# Patient Record
Sex: FEMALE | Race: BLACK OR AFRICAN AMERICAN | Employment: OTHER | URBAN - METROPOLITAN AREA
[De-identification: names, ages, dates, MRNs, and addresses within clinical notes are randomized per-mention and may not be internally consistent; named-entity substitution may affect disease eponyms.]

---

## 2021-03-29 ENCOUNTER — APPOINTMENT (OUTPATIENT)
Dept: GENERAL RADIOLOGY | Age: 66
DRG: 871 | End: 2021-03-29
Attending: EMERGENCY MEDICINE
Payer: MEDICARE

## 2021-03-29 ENCOUNTER — HOSPITAL ENCOUNTER (INPATIENT)
Age: 66
LOS: 3 days | Discharge: HOME | DRG: 871 | End: 2021-04-01
Attending: EMERGENCY MEDICINE | Admitting: INTERNAL MEDICINE
Payer: MEDICARE

## 2021-03-29 DIAGNOSIS — U07.1 PNEUMONIA DUE TO COVID-19 VIRUS: Primary | ICD-10-CM

## 2021-03-29 DIAGNOSIS — J12.82 PNEUMONIA DUE TO COVID-19 VIRUS: Primary | ICD-10-CM

## 2021-03-29 PROBLEM — Z20.822 SUSPECTED COVID-19 VIRUS INFECTION: Status: ACTIVE | Noted: 2021-03-29

## 2021-03-29 PROBLEM — J18.9 ACUTE PNEUMONIA: Status: ACTIVE | Noted: 2021-03-29

## 2021-03-29 LAB
ALBUMIN SERPL-MCNC: 3.4 G/DL (ref 3.4–5)
ALBUMIN/GLOB SERPL: 0.7 {RATIO} (ref 0.8–1.7)
ALP SERPL-CCNC: 200 U/L (ref 45–117)
ALT SERPL-CCNC: 53 U/L (ref 13–56)
ANION GAP SERPL CALC-SCNC: 8 MMOL/L (ref 3–18)
APPEARANCE UR: ABNORMAL
AST SERPL-CCNC: 60 U/L (ref 10–38)
ATRIAL RATE: 102 BPM
BACTERIA URNS QL MICRO: ABNORMAL /HPF
BASOPHILS # BLD: 0 K/UL (ref 0–0.1)
BASOPHILS NFR BLD: 0 % (ref 0–2)
BILIRUB SERPL-MCNC: 0.3 MG/DL (ref 0.2–1)
BILIRUB UR QL: NEGATIVE
BUN SERPL-MCNC: 10 MG/DL (ref 7–18)
BUN/CREAT SERPL: 12 (ref 12–20)
CALCIUM SERPL-MCNC: 8.5 MG/DL (ref 8.5–10.1)
CALCULATED P AXIS, ECG09: 65 DEGREES
CALCULATED R AXIS, ECG10: 60 DEGREES
CALCULATED T AXIS, ECG11: 34 DEGREES
CHLORIDE SERPL-SCNC: 102 MMOL/L (ref 100–111)
CK MB CFR SERPL CALC: NORMAL % (ref 0–4)
CK MB SERPL-MCNC: <1 NG/ML (ref 5–25)
CK SERPL-CCNC: 110 U/L (ref 26–192)
CO2 SERPL-SCNC: 28 MMOL/L (ref 21–32)
COLOR UR: YELLOW
COVID-19 RAPID TEST, COVR: DETECTED
CREAT SERPL-MCNC: 0.82 MG/DL (ref 0.6–1.3)
CRP SERPL-MCNC: 3.9 MG/DL (ref 0–0.3)
D DIMER PPP FEU-MCNC: 0.6 UG/ML(FEU)
DIAGNOSIS, 93000: NORMAL
DIFFERENTIAL METHOD BLD: NORMAL
EOSINOPHIL # BLD: 0 K/UL (ref 0–0.4)
EOSINOPHIL NFR BLD: 0 % (ref 0–5)
EPITH CASTS URNS QL MICRO: ABNORMAL /LPF (ref 0–5)
ERYTHROCYTE [DISTWIDTH] IN BLOOD BY AUTOMATED COUNT: 13.7 % (ref 11.6–14.5)
FERRITIN SERPL-MCNC: 292 NG/ML (ref 8–388)
FIBRINOGEN PPP-MCNC: 528 MG/DL (ref 210–451)
GLOBULIN SER CALC-MCNC: 4.6 G/DL (ref 2–4)
GLUCOSE SERPL-MCNC: 88 MG/DL (ref 74–99)
GLUCOSE UR STRIP.AUTO-MCNC: NEGATIVE MG/DL
GRAN CASTS URNS QL MICRO: ABNORMAL /LPF
HCT VFR BLD AUTO: 37.9 % (ref 35–45)
HGB BLD-MCNC: 12.3 G/DL (ref 12–16)
HGB UR QL STRIP: ABNORMAL
INR PPP: 1.1 (ref 0.8–1.2)
KETONES UR QL STRIP.AUTO: ABNORMAL MG/DL
LACTATE BLD-SCNC: 0.82 MMOL/L (ref 0.4–2)
LDH SERPL L TO P-CCNC: 363 U/L (ref 81–234)
LEUKOCYTE ESTERASE UR QL STRIP.AUTO: NEGATIVE
LYMPHOCYTES # BLD: 2.4 K/UL (ref 0.9–3.6)
LYMPHOCYTES NFR BLD: 32 % (ref 21–52)
MAGNESIUM SERPL-MCNC: 2.1 MG/DL (ref 1.6–2.6)
MCH RBC QN AUTO: 28.3 PG (ref 24–34)
MCHC RBC AUTO-ENTMCNC: 32.5 G/DL (ref 31–37)
MCV RBC AUTO: 87.1 FL (ref 74–97)
MONOCYTES # BLD: 0.7 K/UL (ref 0.05–1.2)
MONOCYTES NFR BLD: 10 % (ref 3–10)
NEUTS SEG # BLD: 4.4 K/UL (ref 1.8–8)
NEUTS SEG NFR BLD: 58 % (ref 40–73)
NITRITE UR QL STRIP.AUTO: NEGATIVE
P-R INTERVAL, ECG05: 146 MS
PH UR STRIP: 6 [PH] (ref 5–8)
PLATELET # BLD AUTO: 281 K/UL (ref 135–420)
PMV BLD AUTO: 10.3 FL (ref 9.2–11.8)
POTASSIUM SERPL-SCNC: 2.8 MMOL/L (ref 3.5–5.5)
PROCALCITONIN SERPL-MCNC: <0.05 NG/ML
PROT SERPL-MCNC: 8 G/DL (ref 6.4–8.2)
PROT UR STRIP-MCNC: 100 MG/DL
PROTHROMBIN TIME: 13.7 SEC (ref 11.5–15.2)
Q-T INTERVAL, ECG07: 348 MS
QRS DURATION, ECG06: 78 MS
QTC CALCULATION (BEZET), ECG08: 453 MS
RBC # BLD AUTO: 4.35 M/UL (ref 4.2–5.3)
RBC #/AREA URNS HPF: NEGATIVE /HPF (ref 0–5)
SODIUM SERPL-SCNC: 138 MMOL/L (ref 136–145)
SOURCE, COVRS: ABNORMAL
SP GR UR REFRACTOMETRY: 1.02 (ref 1–1.03)
TROPONIN I SERPL-MCNC: <0.02 NG/ML (ref 0–0.04)
UROBILINOGEN UR QL STRIP.AUTO: 1 EU/DL (ref 0.2–1)
VENTRICULAR RATE, ECG03: 102 BPM
WBC # BLD AUTO: 7.5 K/UL (ref 4.6–13.2)
WBC URNS QL MICRO: ABNORMAL /HPF (ref 0–4)

## 2021-03-29 PROCEDURE — 96374 THER/PROPH/DIAG INJ IV PUSH: CPT

## 2021-03-29 PROCEDURE — 87635 SARS-COV-2 COVID-19 AMP PRB: CPT

## 2021-03-29 PROCEDURE — 82728 ASSAY OF FERRITIN: CPT

## 2021-03-29 PROCEDURE — 2709999900 HC NON-CHARGEABLE SUPPLY

## 2021-03-29 PROCEDURE — 85379 FIBRIN DEGRADATION QUANT: CPT

## 2021-03-29 PROCEDURE — 74011000258 HC RX REV CODE- 258: Performed by: EMERGENCY MEDICINE

## 2021-03-29 PROCEDURE — 93005 ELECTROCARDIOGRAM TRACING: CPT

## 2021-03-29 PROCEDURE — 87040 BLOOD CULTURE FOR BACTERIA: CPT

## 2021-03-29 PROCEDURE — 99285 EMERGENCY DEPT VISIT HI MDM: CPT

## 2021-03-29 PROCEDURE — 99223 1ST HOSP IP/OBS HIGH 75: CPT | Performed by: INTERNAL MEDICINE

## 2021-03-29 PROCEDURE — 77030038269 HC DRN EXT URIN PURWCK BARD -A

## 2021-03-29 PROCEDURE — 87449 NOS EACH ORGANISM AG IA: CPT

## 2021-03-29 PROCEDURE — 86140 C-REACTIVE PROTEIN: CPT

## 2021-03-29 PROCEDURE — 74011250636 HC RX REV CODE- 250/636: Performed by: EMERGENCY MEDICINE

## 2021-03-29 PROCEDURE — 80053 COMPREHEN METABOLIC PANEL: CPT

## 2021-03-29 PROCEDURE — 71045 X-RAY EXAM CHEST 1 VIEW: CPT

## 2021-03-29 PROCEDURE — 96375 TX/PRO/DX INJ NEW DRUG ADDON: CPT

## 2021-03-29 PROCEDURE — XW033E5 INTRODUCTION OF REMDESIVIR ANTI-INFECTIVE INTO PERIPHERAL VEIN, PERCUTANEOUS APPROACH, NEW TECHNOLOGY GROUP 5: ICD-10-PCS | Performed by: INTERNAL MEDICINE

## 2021-03-29 PROCEDURE — 74011250637 HC RX REV CODE- 250/637: Performed by: EMERGENCY MEDICINE

## 2021-03-29 PROCEDURE — 65660000000 HC RM CCU STEPDOWN

## 2021-03-29 PROCEDURE — 85610 PROTHROMBIN TIME: CPT

## 2021-03-29 PROCEDURE — 81001 URINALYSIS AUTO W/SCOPE: CPT

## 2021-03-29 PROCEDURE — 83615 LACTATE (LD) (LDH) ENZYME: CPT

## 2021-03-29 PROCEDURE — 83735 ASSAY OF MAGNESIUM: CPT

## 2021-03-29 PROCEDURE — 82553 CREATINE MB FRACTION: CPT

## 2021-03-29 PROCEDURE — 85025 COMPLETE CBC W/AUTO DIFF WBC: CPT

## 2021-03-29 PROCEDURE — 83605 ASSAY OF LACTIC ACID: CPT

## 2021-03-29 PROCEDURE — 84145 PROCALCITONIN (PCT): CPT

## 2021-03-29 PROCEDURE — 85384 FIBRINOGEN ACTIVITY: CPT

## 2021-03-29 RX ORDER — POTASSIUM CHLORIDE 20 MEQ/1
40 TABLET, EXTENDED RELEASE ORAL
Status: COMPLETED | OUTPATIENT
Start: 2021-03-29 | End: 2021-03-29

## 2021-03-29 RX ORDER — MORPHINE SULFATE 4 MG/ML
4 INJECTION INTRAVENOUS
Status: COMPLETED | OUTPATIENT
Start: 2021-03-29 | End: 2021-03-29

## 2021-03-29 RX ORDER — LEVOFLOXACIN 5 MG/ML
750 INJECTION, SOLUTION INTRAVENOUS EVERY 24 HOURS
Status: DISCONTINUED | OUTPATIENT
Start: 2021-03-29 | End: 2021-03-30

## 2021-03-29 RX ORDER — DEXAMETHASONE SODIUM PHOSPHATE 4 MG/ML
6 INJECTION, SOLUTION INTRA-ARTICULAR; INTRALESIONAL; INTRAMUSCULAR; INTRAVENOUS; SOFT TISSUE EVERY 24 HOURS
Status: DISCONTINUED | OUTPATIENT
Start: 2021-03-29 | End: 2021-04-01 | Stop reason: HOSPADM

## 2021-03-29 RX ORDER — VANCOMYCIN 2 GRAM/500 ML IN 0.9 % SODIUM CHLORIDE INTRAVENOUS
2000 ONCE
Status: COMPLETED | OUTPATIENT
Start: 2021-03-29 | End: 2021-03-30

## 2021-03-29 RX ORDER — SODIUM CHLORIDE 0.9 % (FLUSH) 0.9 %
5-10 SYRINGE (ML) INJECTION AS NEEDED
Status: DISCONTINUED | OUTPATIENT
Start: 2021-03-29 | End: 2021-04-01 | Stop reason: HOSPADM

## 2021-03-29 RX ORDER — ONDANSETRON 2 MG/ML
4 INJECTION INTRAMUSCULAR; INTRAVENOUS
Status: COMPLETED | OUTPATIENT
Start: 2021-03-29 | End: 2021-03-29

## 2021-03-29 RX ORDER — ACETAMINOPHEN 325 MG/1
650 TABLET ORAL
Status: DISCONTINUED | OUTPATIENT
Start: 2021-03-29 | End: 2021-04-01 | Stop reason: HOSPADM

## 2021-03-29 RX ORDER — ENOXAPARIN SODIUM 100 MG/ML
30 INJECTION SUBCUTANEOUS EVERY 12 HOURS
Status: DISCONTINUED | OUTPATIENT
Start: 2021-03-29 | End: 2021-03-30

## 2021-03-29 RX ORDER — ACETAMINOPHEN 650 MG/1
650 SUPPOSITORY RECTAL
Status: DISCONTINUED | OUTPATIENT
Start: 2021-03-29 | End: 2021-03-30

## 2021-03-29 RX ADMIN — VANCOMYCIN HYDROCHLORIDE 2000 MG: 10 INJECTION, POWDER, LYOPHILIZED, FOR SOLUTION INTRAVENOUS at 22:12

## 2021-03-29 RX ADMIN — ENOXAPARIN SODIUM 30 MG: 100 INJECTION SUBCUTANEOUS at 22:11

## 2021-03-29 RX ADMIN — SODIUM CHLORIDE 1000 ML: 900 INJECTION, SOLUTION INTRAVENOUS at 18:32

## 2021-03-29 RX ADMIN — LEVOFLOXACIN 750 MG: 750 INJECTION, SOLUTION INTRAVENOUS at 20:03

## 2021-03-29 RX ADMIN — SODIUM CHLORIDE 340 ML: 900 INJECTION, SOLUTION INTRAVENOUS at 18:00

## 2021-03-29 RX ADMIN — DEXAMETHASONE SODIUM PHOSPHATE 6 MG: 4 INJECTION, SOLUTION INTRAMUSCULAR; INTRAVENOUS at 22:11

## 2021-03-29 RX ADMIN — ONDANSETRON 4 MG: 2 INJECTION INTRAMUSCULAR; INTRAVENOUS at 19:55

## 2021-03-29 RX ADMIN — PIPERACILLIN AND TAZOBACTAM 3.38 G: 3; .375 INJECTION, POWDER, LYOPHILIZED, FOR SOLUTION INTRAVENOUS at 19:07

## 2021-03-29 RX ADMIN — MORPHINE SULFATE 4 MG: 4 INJECTION, SOLUTION INTRAMUSCULAR; INTRAVENOUS at 19:55

## 2021-03-29 RX ADMIN — POTASSIUM CHLORIDE 40 MEQ: 1500 TABLET, EXTENDED RELEASE ORAL at 22:11

## 2021-03-29 NOTE — Clinical Note
Status[de-identified] INPATIENT [101]   Type of Bed: Telemetry [19]   Inpatient Hospitalization Certified Necessary for the Following Reasons: 3.  Patient receiving treatment that can only be provided in an inpatient setting (further clarification in H&P documentation)   Admitting Diagnosis: Suspected COVID-19 virus infection [[id number]]   Admitting Diagnosis: Acute pneumonia [[id number]]   Admitting Physician: Jose Welch   Attending Physician: Jose Welch   Estimated Length of Stay: 2 Midnights   Discharge Plan[de-identified] Home with Office Follow-up

## 2021-03-29 NOTE — ED PROVIDER NOTES
EMERGENCY DEPARTMENT HISTORY AND PHYSICAL EXAM  This was created with voice recognition software and transcription errors may be present. 6:11 PM  Date: 3/29/2021  Patient Name: Kelton Mendez    History of Presenting Illness     Chief Complaint:    History Provided By:     HPI: Kelton Mendez is a 76 y.o. female no significant past medical history who is visiting from [facility] to stay around here with her brother. Who presents with 1 week of fever cough chills. No abdominal pain is associate with nausea but no vomiting. No chest pain. She is short of breath. No other aggravating or alleviating factors no other associated symptoms. Patient has no history of COPD or asthma asthma as a child    PCP: Alicia, Not On File      Past History     Past Medical History:  No past medical history on file. Past Surgical History:  No past surgical history on file. Family History:  No family history on file. Social History:  Social History     Tobacco Use    Smoking status: Not on file   Substance Use Topics    Alcohol use: Not on file    Drug use: Not on file       Allergies: Allergies   Allergen Reactions    Sulfur Hives       Review of Systems     Review of Systems   Constitutional: Negative for fever. All other systems reviewed and are negative. 10 point review of systems otherwise negative unless noted in HPI. Physical Exam       Physical Exam  Constitutional:       Appearance: She is well-developed. HENT:      Head: Normocephalic and atraumatic. Eyes:      Pupils: Pupils are equal, round, and reactive to light. Neck:      Musculoskeletal: Normal range of motion and neck supple. Cardiovascular:      Rate and Rhythm: Normal rate and regular rhythm. Heart sounds: Normal heart sounds. No murmur. No friction rub. Pulmonary:      Effort: Pulmonary effort is normal. No respiratory distress. Breath sounds: Normal breath sounds. No wheezing.       Comments: Coarse Breath sounds bilaterally in the lower  Abdominal:      General: There is no distension. Palpations: Abdomen is soft. Tenderness: There is no abdominal tenderness. There is no guarding or rebound. Musculoskeletal: Normal range of motion. Skin:     General: Skin is warm and dry. Neurological:      Mental Status: She is alert and oriented to person, place, and time. Psychiatric:         Behavior: Behavior normal.         Thought Content: Thought content normal.         Diagnostic Study Results     Vital Signs   Visit Vitals  /84 (BP 1 Location: Left upper arm, BP Patient Position: At rest)   Pulse 100   Temp 99.8 °F (37.7 °C)   Resp 16   Wt 78 kg (172 lb)   SpO2 91%   BMI 27.35 kg/m²      EKG: Axis normal intervals there is no ST elevation or depression no hypertrophy  Labs: CBC chemistry unremarkable  Imaging: X-ray concerning for right lower lobe infiltrate    Medical Decision Making     ED Course: Progress Notes, Reevaluation, and Consults:      Provider Notes (Medical Decision Making): 70-year-old female presents with fever cough chills x1 week. She is tachycardic and hypoxic in room air. Will obtain basic labs including a chest x-ray is Covid testing and likely need for admission    Is febrile hypoxic tachycardic without infiltrate. Will admit to the hospitalist service discussed with Dr. Suazo Linear antibiotics have been started Covid is pending       Diagnosis     Clinical Impression: No diagnosis found.     Disposition:    Patient's Medications    No medications on file

## 2021-03-29 NOTE — ED TRIAGE NOTES
Per medic: Patient called complaining of a cough for the past week. Patient says since Saturday her chest has been hurting and its hard for her to swallow. Upon EMS arrival patient was noted to be very anxious.

## 2021-03-29 NOTE — PROGRESS NOTES
Kinetic Dosing- Initial Progress Note    Pharmacy Consult ordered by Dr. Wilver Harris     Indication: Sepsis of Unknown Origin    Patient clinical status and labs ordered/reviewed. Pt Weight Weight: 78 kg (172 lb)   Serum Creatinine Lab Results   Component Value Date/Time    Creatinine 0.82 03/29/2021 05:48 PM       Creatinine Clearance CrCl cannot be calculated (Unknown ideal weight.). BUN Lab Results   Component Value Date/Time    BUN 10 03/29/2021 05:48 PM       WBC Lab Results   Component Value Date/Time    WBC 7.5 03/29/2021 05:48 PM      Temperature Temp: (!) 102 °F (38.9 °C)     HR Pulse (Heart Rate): (!) 115     BP BP: 121/87           Kinetic Dosing Parameters:   Vd = 54     K = 0.0589              t ½ = 11.8    Drug Levels:   Vancomycin    No results for input(s): VANCP, VANCT, VANCR, VANRA in the last 72 hours.                  Dose for naïve patient was initiated at: 2000 mg IVPB x 1  BMP ordered daily x 3 starting in AM  Initial estimated dose will be 1000 mg every 12 hours    Continue to monitor    Sign: Jamaal Bain  Date: 3/29/2021  Time: 6:59 PM

## 2021-03-30 LAB
L PNEUMO AG UR QL IA: NEGATIVE
S PNEUM AG UR QL: NEGATIVE

## 2021-03-30 PROCEDURE — 74011000250 HC RX REV CODE- 250: Performed by: INTERNAL MEDICINE

## 2021-03-30 PROCEDURE — 2709999900 HC NON-CHARGEABLE SUPPLY

## 2021-03-30 PROCEDURE — 65660000000 HC RM CCU STEPDOWN

## 2021-03-30 PROCEDURE — 74011250636 HC RX REV CODE- 250/636: Performed by: INTERNAL MEDICINE

## 2021-03-30 PROCEDURE — 74011000258 HC RX REV CODE- 258: Performed by: INTERNAL MEDICINE

## 2021-03-30 PROCEDURE — 99232 SBSQ HOSP IP/OBS MODERATE 35: CPT | Performed by: INTERNAL MEDICINE

## 2021-03-30 PROCEDURE — 74011250637 HC RX REV CODE- 250/637: Performed by: INTERNAL MEDICINE

## 2021-03-30 RX ORDER — CHOLECALCIFEROL (VITAMIN D3) 125 MCG
5 CAPSULE ORAL
Status: DISCONTINUED | OUTPATIENT
Start: 2021-03-30 | End: 2021-03-30 | Stop reason: CLARIF

## 2021-03-30 RX ORDER — POTASSIUM CHLORIDE 20 MEQ/1
40 TABLET, EXTENDED RELEASE ORAL 2 TIMES DAILY
Status: DISCONTINUED | OUTPATIENT
Start: 2021-03-30 | End: 2021-04-01 | Stop reason: HOSPADM

## 2021-03-30 RX ORDER — ROSUVASTATIN CALCIUM 20 MG/1
20 TABLET, COATED ORAL DAILY
COMMUNITY

## 2021-03-30 RX ORDER — IBUPROFEN 800 MG/1
1000 TABLET ORAL
COMMUNITY
End: 2021-04-01

## 2021-03-30 RX ORDER — ACETAMINOPHEN 650 MG/1
650 SUPPOSITORY RECTAL
Status: DISCONTINUED | OUTPATIENT
Start: 2021-03-30 | End: 2021-04-01 | Stop reason: HOSPADM

## 2021-03-30 RX ORDER — KETOROLAC TROMETHAMINE 15 MG/ML
15 INJECTION, SOLUTION INTRAMUSCULAR; INTRAVENOUS
Status: DISCONTINUED | OUTPATIENT
Start: 2021-03-30 | End: 2021-03-30

## 2021-03-30 RX ORDER — MELATONIN
2000 DAILY
Status: DISCONTINUED | OUTPATIENT
Start: 2021-03-30 | End: 2021-04-01 | Stop reason: HOSPADM

## 2021-03-30 RX ORDER — CYCLOBENZAPRINE HCL 10 MG
10 TABLET ORAL
COMMUNITY

## 2021-03-30 RX ORDER — AMLODIPINE BESYLATE 2.5 MG/1
2.5 TABLET ORAL DAILY
COMMUNITY

## 2021-03-30 RX ORDER — OXYCODONE AND ACETAMINOPHEN 5; 325 MG/1; MG/1
1 TABLET ORAL
Status: DISCONTINUED | OUTPATIENT
Start: 2021-03-30 | End: 2021-04-01 | Stop reason: HOSPADM

## 2021-03-30 RX ORDER — AMLODIPINE BESYLATE 5 MG/1
5 TABLET ORAL DAILY
Status: DISCONTINUED | OUTPATIENT
Start: 2021-03-30 | End: 2021-04-01 | Stop reason: HOSPADM

## 2021-03-30 RX ORDER — GUAIFENESIN/DEXTROMETHORPHAN 100-10MG/5
5 SYRUP ORAL
Status: DISCONTINUED | OUTPATIENT
Start: 2021-03-30 | End: 2021-04-01 | Stop reason: HOSPADM

## 2021-03-30 RX ORDER — ENOXAPARIN SODIUM 100 MG/ML
30 INJECTION SUBCUTANEOUS EVERY 12 HOURS
Status: DISCONTINUED | OUTPATIENT
Start: 2021-03-30 | End: 2021-04-01 | Stop reason: HOSPADM

## 2021-03-30 RX ORDER — ATORVASTATIN CALCIUM 20 MG/1
20 TABLET, FILM COATED ORAL
Status: DISCONTINUED | OUTPATIENT
Start: 2021-03-30 | End: 2021-04-01 | Stop reason: HOSPADM

## 2021-03-30 RX ORDER — CALCIUM CARB/MAGNESIUM CARB 311-232MG
5 TABLET ORAL
Status: DISCONTINUED | OUTPATIENT
Start: 2021-03-30 | End: 2021-04-01 | Stop reason: HOSPADM

## 2021-03-30 RX ADMIN — Medication 2000 UNITS: at 09:28

## 2021-03-30 RX ADMIN — ACETAMINOPHEN 650 MG: 325 TABLET ORAL at 02:37

## 2021-03-30 RX ADMIN — OXYCODONE HYDROCHLORIDE AND ACETAMINOPHEN 1 TABLET: 5; 325 TABLET ORAL at 19:04

## 2021-03-30 RX ADMIN — Medication 5 MG: at 02:38

## 2021-03-30 RX ADMIN — REMDESIVIR 200 MG: 100 INJECTION, POWDER, LYOPHILIZED, FOR SOLUTION INTRAVENOUS at 03:37

## 2021-03-30 RX ADMIN — AZITHROMYCIN DIHYDRATE 500 MG: 500 INJECTION, POWDER, LYOPHILIZED, FOR SOLUTION INTRAVENOUS at 10:27

## 2021-03-30 RX ADMIN — ATORVASTATIN CALCIUM 20 MG: 20 TABLET, FILM COATED ORAL at 02:37

## 2021-03-30 RX ADMIN — ACETAMINOPHEN 650 MG: 325 TABLET ORAL at 16:31

## 2021-03-30 RX ADMIN — Medication 5 MG: at 21:15

## 2021-03-30 RX ADMIN — KETOROLAC TROMETHAMINE 15 MG: 15 INJECTION, SOLUTION INTRAMUSCULAR; INTRAVENOUS at 02:38

## 2021-03-30 RX ADMIN — POTASSIUM CHLORIDE 40 MEQ: 1500 TABLET, EXTENDED RELEASE ORAL at 19:04

## 2021-03-30 RX ADMIN — POTASSIUM CHLORIDE 40 MEQ: 1500 TABLET, EXTENDED RELEASE ORAL at 09:27

## 2021-03-30 RX ADMIN — ATORVASTATIN CALCIUM 20 MG: 20 TABLET, FILM COATED ORAL at 21:15

## 2021-03-30 RX ADMIN — ENOXAPARIN SODIUM 30 MG: 30 INJECTION SUBCUTANEOUS at 09:28

## 2021-03-30 RX ADMIN — POTASSIUM CHLORIDE 40 MEQ: 1500 TABLET, EXTENDED RELEASE ORAL at 02:37

## 2021-03-30 RX ADMIN — GUAIFENESIN AND DEXTROMETHORPHAN 5 ML: 100; 10 SYRUP ORAL at 02:38

## 2021-03-30 RX ADMIN — ENOXAPARIN SODIUM 30 MG: 30 INJECTION SUBCUTANEOUS at 21:15

## 2021-03-30 RX ADMIN — DEXAMETHASONE SODIUM PHOSPHATE 6 MG: 4 INJECTION, SOLUTION INTRAMUSCULAR; INTRAVENOUS at 21:15

## 2021-03-30 NOTE — PROGRESS NOTES
[facility] Hospitalist Group  Progress Note    Patient: Ean Kerr Age: 76 y.o. : 1955 MR#: [id number] SSN: [id number]  Date/Time: 3/30/2021    Subjective:   Pt reports having worsening SOB since her azithromycin IV was hung. C/o diffuse abdominal pain also. Appears in mild discomfort, some degree of conversational dyspnea. Assessment/Plan:   Patient is 76year old female with history of hypertension dyslipidemia admitted on 3/29 after presenting with chief complaint of cough and shortness of breath:    -Covid pneumonia: Some elevations in her inflammatory markers. Pro-Maicol is less than 0.05. Discussed with ID specialist.  Currently on Decadron, remdesivir, azithromycin.  -Hypoxia: Patient able to tolerate to room air to some extent but has conversational dyspnea and is asking for oxygen supplementation. Due to above. -Hypokalemia, normal magnesium. Status post low supplementation. HISTORY OF:  -Hypertension  -Dyslipidemia    PLAN:  -Continue Decadron, remdesivir course, oxygen supplementation, monitor closely.   -Replace and follow potassium    Additional Notes:      Case discussed with:  [x]Patient  []Family  []Nursing  []Case Management  DVT Prophylaxis:  [x]Lovenox  []Hep SQ  []SCDs  []Coumadin   []On Heparin gtt    Objective:   VS:   Visit Vitals  /72   Pulse 77   Temp 96.8 °F (36 °C)   Resp 19   Ht 5' 6\" (1.676 m)   Wt 78 kg (172 lb)   SpO2 99%   Breastfeeding No   BMI 27.76 kg/m²      Tmax/24hrs: Temp (24hrs), Av.5 °F (36.9 °C), Min:95.8 °F (35.4 °C), Max:102 °F (38.9 °C)    Input/Output: No intake or output data in the 24 hours ending 21 1613    General:  Alert, awake, in NAD  Cardiovascular:  No JVD, no peripheral edema  Pulmonary:  With conversational dyspnea  GI:  abd diffusely tender  Extremities:  No edema  Additional:      Labs:    Recent Results (from the past 24 hour(s))   EKG, 12 LEAD, INITIAL    Collection Time: 21  4:35 PM   Result Value Ref Range    Ventricular Rate 102 BPM    Atrial Rate 102 BPM    P-R Interval 146 ms    QRS Duration 78 ms    Q-T Interval 348 ms    QTC Calculation (Bezet) 453 ms    Calculated P Axis 65 degrees    Calculated R Axis 60 degrees    Calculated T Axis 34 degrees    Diagnosis       Sinus tachycardia  Cannot rule out Anterior infarct , age undetermined  Abnormal ECG  No previous ECGs available  Confirmed by Ambar Wild MD, [facility] March ([id number]) on 3/29/2021 4:49:26 PM     CULTURE, BLOOD    Collection Time: 03/29/21  5:48 PM    Specimen: Blood   Result Value Ref Range    Special Requests: NO SPECIAL REQUESTS      Culture result: NO GROWTH AFTER 12 HOURS     METABOLIC PANEL, COMPREHENSIVE    Collection Time: 03/29/21  5:48 PM   Result Value Ref Range    Sodium 138 136 - 145 mmol/L    Potassium 2.8 (LL) 3.5 - 5.5 mmol/L    Chloride 102 100 - 111 mmol/L    CO2 28 21 - 32 mmol/L    Anion gap 8 3.0 - 18 mmol/L    Glucose 88 74 - 99 mg/dL    BUN 10 7.0 - 18 MG/DL    Creatinine 0.82 0.6 - 1.3 MG/DL    BUN/Creatinine ratio 12 12 - 20      GFR est AA >60 >60 ml/min/1.73m2    GFR est non-AA >60 >60 ml/min/1.73m2    Calcium 8.5 8.5 - 10.1 MG/DL    Bilirubin, total 0.3 0.2 - 1.0 MG/DL    ALT (SGPT) 53 13 - 56 U/L    AST (SGOT) 60 (H) 10 - 38 U/L    Alk. phosphatase 200 (H) 45 - 117 U/L    Protein, total 8.0 6.4 - 8.2 g/dL    Albumin 3.4 3.4 - 5.0 g/dL    Globulin 4.6 (H) 2.0 - 4.0 g/dL    A-G Ratio 0.7 (L) 0.8 - 1.7     CBC WITH AUTOMATED DIFF    Collection Time: 03/29/21  5:48 PM   Result Value Ref Range    WBC 7.5 4.6 - 13.2 K/uL    RBC 4.35 4.20 - 5.30 M/uL    HGB 12.3 12.0 - 16.0 g/dL    HCT 37.9 35.0 - 45.0 %    MCV 87.1 74.0 - 97.0 FL    MCH 28.3 24.0 - 34.0 PG    MCHC 32.5 31.0 - 37.0 g/dL    RDW 13.7 11.6 - 14.5 %    PLATELET 835 844 - 541 K/uL    MPV 10.3 9.2 - 11.8 FL    NEUTROPHILS 58 40 - 73 %    LYMPHOCYTES 32 21 - 52 %    MONOCYTES 10 3 - 10 %    EOSINOPHILS 0 0 - 5 %    BASOPHILS 0 0 - 2 %    ABS.  NEUTROPHILS 4.4 1.8 - 8.0 K/UL    ABS. LYMPHOCYTES 2.4 0.9 - 3.6 K/UL    ABS. MONOCYTES 0.7 0.05 - 1.2 K/UL    ABS. EOSINOPHILS 0.0 0.0 - 0.4 K/UL    ABS.  BASOPHILS 0.0 0.0 - 0.1 K/UL    DF AUTOMATED     CARDIAC PANEL,(CK, CKMB & TROPONIN)    Collection Time: 03/29/21  5:48 PM   Result Value Ref Range    CK - MB <1.0 <3.6 ng/ml    CK-MB Index  0.0 - 4.0 %     CALCULATION NOT PERFORMED WHEN RESULT IS BELOW LINEAR LIMIT     26 - 192 U/L    Troponin-I, QT <0.02 0.0 - 0.045 NG/ML   LD    Collection Time: 03/29/21  5:48 PM   Result Value Ref Range     (H) 81 - 234 U/L   FERRITIN    Collection Time: 03/29/21  5:48 PM   Result Value Ref Range    Ferritin 292 8 - 388 NG/ML   D DIMER    Collection Time: 03/29/21  5:48 PM   Result Value Ref Range    D DIMER 0.60 (H) <0.46 ug/ml(FEU)   PROCALCITONIN    Collection Time: 03/29/21  5:48 PM   Result Value Ref Range    Procalcitonin <0.05 ng/mL   C REACTIVE PROTEIN, QT    Collection Time: 03/29/21  5:48 PM   Result Value Ref Range    C-Reactive protein 3.9 (H) 0 - 0.3 mg/dL   FIBRINOGEN    Collection Time: 03/29/21  5:48 PM   Result Value Ref Range    Fibrinogen 528 (H) 210 - 451 mg/dL   PROTHROMBIN TIME + INR    Collection Time: 03/29/21  5:48 PM   Result Value Ref Range    Prothrombin time 13.7 11.5 - 15.2 sec    INR 1.1 0.8 - 1.2     MAGNESIUM    Collection Time: 03/29/21  5:48 PM   Result Value Ref Range    Magnesium 2.1 1.6 - 2.6 mg/dL   CULTURE, BLOOD    Collection Time: 03/29/21  5:59 PM    Specimen: Blood   Result Value Ref Range    Special Requests: NO SPECIAL REQUESTS      Culture result: NO GROWTH AFTER 12 HOURS     POC LACTIC ACID    Collection Time: 03/29/21  6:00 PM   Result Value Ref Range    Lactic Acid (POC) 0.82 0.40 - 2.00 mmol/L   URINALYSIS W/ RFLX MICROSCOPIC    Collection Time: 03/29/21  8:14 PM   Result Value Ref Range    Color YELLOW      Appearance CLOUDY      Specific gravity 1.017 1.005 - 1.030      pH (UA) 6.0 5.0 - 8.0      Protein 100 (A) NEG mg/dL    Glucose Negative NEG mg/dL    Ketone TRACE (A) NEG mg/dL    Bilirubin Negative NEG      Blood SMALL (A) NEG      Urobilinogen 1.0 0.2 - 1.0 EU/dL    Nitrites Negative NEG      Leukocyte Esterase Negative NEG     URINE MICROSCOPIC ONLY    Collection Time: 03/29/21  8:14 PM   Result Value Ref Range    WBC 5 to 10 0 - 4 /hpf    RBC Negative 0 - 5 /hpf    Epithelial cells FEW 0 - 5 /lpf    Bacteria FEW (A) NEG /hpf    Granular cast 1 to 5 NEG /lpf   LEGIONELLA PNEUMOPHILA AG, URINE    Collection Time: 03/29/21  8:14 PM    Specimen: Urine, random   Result Value Ref Range    Legionella Ag, urine Negative NEG     STREP PNEUMO AG, URINE    Collection Time: 03/29/21  8:14 PM    Specimen: Urine, random   Result Value Ref Range    Strep pneumo Ag, urine Negative NEG     COVID-19 RAPID TEST    Collection Time: 03/29/21  8:22 PM   Result Value Ref Range    Specimen source Nasopharyngeal      COVID-19 rapid test Detected (AA) NOTD       Additional Data Reviewed:      Signed By: Lorrie Iglesias MD     March 30, 2021 4:13 PM

## 2021-03-30 NOTE — H&P
Date of Admission: 3/29/2021      Assessment:   Sepsis (fever, tachycardia, with pneumonia)  COVID pneumonia:  RLL infiltrate (per my read of CXR)  Hypokalemia  Hypertension: Currently controlled  Dyslipidemia: On Lipitor at home  History of chronic back pain with reported lower extremity neuropathy bilaterally: She has followed with physical therapy for several years for this complaint. Plan:   Replace potassium  CBC, BMP in am  F/u blood cx from 3/29 (NGTD x 2)  Maintain O2 sats > 92%  LDH, d-dimer, ferritin, procalcitonin, troponin, CRP ordered and pending. Vit C, Vit D, zinc, melatonin  Lovenox for DVT prophylaxis  Change Vanc/Zosyn/levofloxacin to Azithromycin  Continue Decadron x 10 days  Start Remdesivir (moderate-severe COVID with hypoxia and infiltrate on CXR as indication for initiating therapy)  Resume home medications: Norvasc, Lipitor, Gabapentin    Full Code  Patient designated her daughter as MDM. At the time of my interview, patient did NOT want information shared with her brother or sister (she lives with her brother but does not want him to be given information). Kylee Loomis D.O. Internal Medicine and Infectious Diseases      Subjective:    Patient is a 76 y. o.female who is being evaluated for cough and SOB. Ms. Kennedi Barcenas is a pleasant 65-year-old female with a past medical history of hypertension and dyslipidemia as well as chronic low back pain who is presenting to the emergency department with a 7 to 10-day history of worsening shortness of breath. She states that she had been living in [facility] up until this week. Her initial symptoms started as feeling globally weak and tired with increased achiness. She had gone to physical therapy appointment that day when her symptoms started and they sent her to the emergency department for further evaluation. She states that she had a gamut of lab tests as well as a Covid test at that time which was negative.   Over the past week however she continues to feel worse with developing shortness of breath and cough. She continues to be fatigued but her overall body aches have improved. She denies any loss of taste or sensation. She denies any fevers or chills. What brought her to the emergency department was that she was having increased shortness of breath with any type of movement or long conversations. Overall she feels ill and weak. Has a poor appetite. She has some mild pain in her chest which is exacerbated by coughing. She has been able to keep food down without any nausea vomiting diarrhea or abdominal pain. No past medical history on file. No past surgical history on file. No family history on file.   Medications reviewed as below:   Current Facility-Administered Medications   Medication Dose Route Frequency Provider Last Rate Last Admin    sodium chloride (NS) flush 5-10 mL  5-10 mL IntraVENous PRN Amparo Hebert MD        piperacillin-tazobactam (ZOSYN) 3.375 g in 0.9% sodium chloride (MBP/ADV) 100 mL MBP  3.375 g IntraVENous Q6H Amparo Hebert  mL/hr at 03/29/21 1907 3.375 g at 03/29/21 1907    levoFLOXacin (LEVAQUIN) 750 mg in D5W IVPB  750 mg IntraVENous Q24H Amparo Hebert  mL/hr at 03/29/21 2003 750 mg at 03/29/21 2003    vancomycin (VANCOCIN) 2000 mg in  ml infusion  2,000 mg IntraVENous ONCE Amparo Hebert MD        VANCOMYCIN INFORMATION NOTE   Other Rx Dosing/Monitoring Amparo Hebert MD        enoxaparin (LOVENOX) injection 30 mg  30 mg SubCUTAneous Q12H Amparo Hebert MD        acetaminophen (TYLENOL) tablet 650 mg  650 mg Oral Q6H PRN Amparo Hebert MD        Or    acetaminophen (TYLENOL) suppository 650 mg  650 mg Rectal Q6H PRN Amparo Hebert MD        dexamethasone (DECADRON) 4 mg/mL injection 6 mg  6 mg IntraVENous Q24H Amparo Hebert MD        potassium chloride (K-DUR, KLOR-CON) SR tablet 40 mEq  40 mEq Oral NOW Amparo Hebert MD         Allergies   Allergen Reactions    Sulfur Hives     Social History     Socioeconomic History    Marital status:      Spouse name: Not on file    Number of children: Not on file    Years of education: Not on file    Highest education level: Not on file   Occupational History    Not on file   Social Needs    Financial resource strain: Not on file    Food insecurity     Worry: Not on file     Inability: Not on file    Transportation needs     Medical: Not on file     Non-medical: Not on file   Tobacco Use    Smoking status: Not on file   Substance and Sexual Activity    Alcohol use: Not on file    Drug use: Not on file    Sexual activity: Not on file   Lifestyle    Physical activity     Days per week: Not on file     Minutes per session: Not on file    Stress: Not on file   Relationships    Social connections     Talks on phone: Not on file     Gets together: Not on file     Attends Nondenominational service: Not on file     Active member of club or organization: Not on file     Attends meetings of clubs or organizations: Not on file     Relationship status: Not on file    Intimate partner violence     Fear of current or ex partner: Not on file     Emotionally abused: Not on file     Physically abused: Not on file     Forced sexual activity: Not on file   Other Topics Concern    Not on file   Social History Narrative    Not on file        Review of Systems    Negative Unless BOLDED    General: fevers, chills, myalgias, arthralgias, unexplained weight loss, malaise, fatigue. HEENT:  headaches,sinus pain or presure, recent URI, recent dental procedures;  tinnitus, hearing loss , visual changes, catarats, dizziness or blurred vision  PUlMONARY:  cough , shortness of breath, sputum production, hx of asthma or COPD. previous treatement for TB or PPD.   Cardiovascular: chest pain, previous CAD/MI, vavlular heart disease,  murmurs  GI:   nausea, vomiting, diarrhea, abdominal pain, prior C.diff  :  urinary frequency, dysuria, hematuria, bladder incontinence. Neurologic:  seizures, syncope or prior CVA/TIA, confusion, memory impairment, neuropathy  Musculoskeletal:  myalgias arthralgias, joint pain/ swelling,  back pain  Skin:  Purities,  recurrent cellulitis,  chronic stasis ulcer, diabetic foot ulcers  Endocrine: polyuria, polydipsia, hair loss, weight gain  Psych: Denies depression or treatment by a psychiatrist/psycologist  Heme-Onc: prior DVT, easy bruising, fatigue, malignancy        Objective:        Visit Vitals  /84 (BP 1 Location: Left upper arm, BP Patient Position: At rest)   Pulse 100   Temp 99.8 °F (37.7 °C)   Resp 16   Wt 78 kg (172 lb)   SpO2 91%   BMI 27.35 kg/m²     Temp (24hrs), Av.9 °F (38.3 °C), Min:99.8 °F (37.7 °C), Max:102 °F (38.9 °C)        General:   awake alert and oriented   Skin:   no rashes or skin lesions noted on limited exam   HEENT:  Normocephalic, atraumatic, PERRL, EOMI, no scleral icterus or pallor; no conjunctival hemmohage;  nasal and oral mucous are moist and without evidence of lesions. No thrush. Dentition good. Neck supple, no bruits. Lymph Nodes:   no cervical, axillary or inguinal adenopathy   Lungs:   non-labored, bilaterally clear to aspiration- no crackles wheezes rales or rhonchi   Heart:  RRR, s1 and s2; no murmurs rubs or gallops, no edema, + pedal pulses   Abdomen:  soft, non-distended, active bowel sounds, no hepatomegaly, no splenomegaly. Non-tender   Genitourinary:  deferred   Extremities:   no clubbing, cyanosis; no joint effusions or swelling; Full ROM of all large joints to the upper and lower extremities; muscle mass appropriate for age   Neurologic:  No gross focal sensory abnormalities; 5/5 muscle strength to upper and lower extremities. Speech appropirate. Cranial nerves intact   Psychiatric:   appropriate and interactive.        Labs: Results:   Chemistry Recent Labs     21  1748   GLU 88      K 2.8*      CO2 28   BUN 10   CREA 0.82   CA 8.5 AGAP 8   BUCR 12   *   TP 8.0   ALB 3.4   GLOB 4.6*   AGRAT 0.7*      CBC w/Diff Recent Labs     03/29/21  1748   WBC 7.5   RBC 4.35   HGB 12.3   HCT 37.9      GRANS 58   LYMPH 32   EOS 0            No results found for: SDES No results found for: CULT       Imaging:      All imaging reviewed from Admission to present as per radiology interpretation in [facility]

## 2021-03-30 NOTE — ED NOTES
TRANSFER - OUT REPORT:    Verbal report given to Tanvi Mackay RN (name) on Ean Kerr  being transferred to [address] (unit) for routine progression of care       Report consisted of patients Situation, Background, Assessment and   Recommendations(SBAR). Information from the following report(s) SBAR, Kardex, ED Summary, [facility] and Recent Results was reviewed with the receiving nurse. Lines:   Peripheral IV 03/29/21 Left Hand (Active)   Site Assessment Clean, dry, & intact 03/29/21 1813   Phlebitis Assessment 0 03/29/21 1813   Infiltration Assessment 0 03/29/21 1813   Dressing Status Clean, dry, & intact 03/29/21 1813   Dressing Type Tape;Transparent 03/29/21 1813   Hub Color/Line Status Blue;Flushed;Patent 03/29/21 1813        Opportunity for questions and clarification was provided.       Patient transported with:   O2 @ 3 liters

## 2021-03-30 NOTE — PROGRESS NOTES
Problem: Falls - Risk of  Goal: *Absence of Falls  Description: Document [facility] Fall Risk and appropriate interventions in the flowsheet.   Outcome: Progressing Towards Goal  Note: Fall Risk Interventions:  Mobility Interventions: Patient to call before getting OOB, Communicate number of staff needed for ambulation/transfer         Medication Interventions: Patient to call before getting OOB

## 2021-03-30 NOTE — CONSULTS
Infectious Disease Consultation Note        Reason: Severe COVID-19 pneumonia    Current abx Prior abx   Azithromycin since 3/29      Lines:       Assessment :  50-year-old female with a past medical history of hypertension and dyslipidemia as well as chronic low back pain who is presenting to the emergency department with a 7 day history of malaise and 3 day h/o worsening shortness of breath. Clinical presentation consistent with sepsis-present on admission due to confirmed COVID-19 pneumonia, severe    Underlying hypertension, obesity puts patient at high risk of clinical deterioration. Status post remdesivir since 3/29/2021    Currently patient on 3 L oxygen via nasal cannula    Recommendations:    1. Continue remdesivir, Decadron, azithromycin  2. Monitor CRP, D-dimer, procalcitonin  3. Prophylactic anticoagulation per primary team  4. Titrate oxygen as tolerated  5. Further recommendations based on the above test results, clinical course  6. Will d/c toradol ordered prn for pain. Please give alternative pain meds. Thank you for consultation request. Above plan was discussed in details with patient. Please call me if any further questions or concerns. Will continue to participate in the care of this patient. HPI:    50-year-old female with a past medical history of hypertension and dyslipidemia as well as chronic low back pain who is presenting to the emergency department with a 7 day history of malaise and 3 day h/o worsening shortness of breath. I obtained history by talking to patient, review of records. She lives in [facility] in apartment. Patient states that she was feeling weak for about a week. Last Thursday she noted some chest discomfort and worsening malaise. She went to local emergency room. Was tested for COVID-19 and was tested negative. Around that time she was also informed by the  that one of her neighbors was in the hospital with Covid infection. She flew to [facility] on 3/27 to meet her brother. In the flight, she had throat discomfort poor appetite and worsening weakness. Finally her sister-in-law contacted EMS on 3/29 and patient was brought to the hospital.  Here she was tested positive for Covid. Chest x-ray revealed multifocal infiltrates. She had temperature of 102. Her oxygen saturation was 91 to 92% on room air. She felt short of breath. She was initiated on Decadron, remdesivir. I have been consulted for further recommendations. She has a poor appetite. She has some mild pain in her chest which is exacerbated by coughing. She has been able to keep food down without any nausea vomiting diarrhea or abdominal pain. No past medical history on file. No past surgical history on file. Current Discharge Medication List      CONTINUE these medications which have NOT CHANGED    Details   cyclobenzaprine (FLEXERIL) 10 mg tablet Take 10 mg by mouth three (3) times daily as needed for Muscle Spasm(s). rosuvastatin (CRESTOR) 20 mg tablet Take 20 mg by mouth daily. amLODIPine (NORVASC) 2.5 mg tablet Take 2.5 mg by mouth daily. ibuprofen (MOTRIN) 800 mg tablet Take 1,000 mg by mouth every eight (8) hours as needed for Pain.              Current Facility-Administered Medications   Medication Dose Route Frequency    enoxaparin (LOVENOX) injection 30 mg  30 mg SubCUTAneous Q12H    acetaminophen (TYLENOL) suppository 650 mg  650 mg Rectal Q6H PRN    guaiFENesin-dextromethorphan (ROBITUSSIN DM) 100-10 mg/5 mL syrup 5 mL  5 mL Oral Q4H PRN    cholecalciferol (VITAMIN D3) (1000 Units /25 mcg) tablet 2,000 Units  2,000 Units Oral DAILY    potassium chloride (K-DUR, KLOR-CON) SR tablet 40 mEq  40 mEq Oral BID    amLODIPine (NORVASC) tablet 5 mg  5 mg Oral DAILY    atorvastatin (LIPITOR) tablet 20 mg  20 mg Oral QHS    melatonin tablet 5 mg  5 mg Oral QHS    [START ON 3/31/2021] remdesivir 100 mg in 0.9% sodium chloride 250 mL IVPB  100 mg IntraVENous Q24H    ketorolac (TORADOL) injection 15 mg  15 mg IntraVENous Q6H PRN    azithromycin (ZITHROMAX) 500 mg in 0.9% sodium chloride 250 mL (VIAL-MATE)  500 mg IntraVENous Q24H    sodium chloride (NS) flush 5-10 mL  5-10 mL IntraVENous PRN    acetaminophen (TYLENOL) tablet 650 mg  650 mg Oral Q6H PRN    dexamethasone (DECADRON) 4 mg/mL injection 6 mg  6 mg IntraVENous Q24H       Allergies: Flagyl [metronidazole] and Sulfur    No family history on file.   Social History     Socioeconomic History    Marital status:      Spouse name: Not on file    Number of children: Not on file    Years of education: Not on file    Highest education level: Not on file   Occupational History    Not on file   Social Needs    Financial resource strain: Not on file    Food insecurity     Worry: Not on file     Inability: Not on file    Transportation needs     Medical: Not on file     Non-medical: Not on file   Tobacco Use    Smoking status: Not on file   Substance and Sexual Activity    Alcohol use: Not on file    Drug use: Not on file    Sexual activity: Not on file   Lifestyle    Physical activity     Days per week: Not on file     Minutes per session: Not on file    Stress: Not on file   Relationships    Social connections     Talks on phone: Not on file     Gets together: Not on file     Attends [employer] service: Not on file     Active member of club or organization: Not on file     Attends meetings of clubs or organizations: Not on file     Relationship status: Not on file    Intimate partner violence     Fear of current or ex partner: Not on file     Emotionally abused: Not on file     Physically abused: Not on file     Forced sexual activity: Not on file   Other Topics Concern    Not on file   Social History Narrative    Not on file     Social History     Tobacco Use   Smoking Status Not on file        Temp (24hrs), Av.5 °F (36.9 °C), Min:95.8 °F (35.4 °C), Max:102 °F (38.9 °C)    Visit Vitals  /72   Pulse 77   Temp 96.8 °F (36 °C)   Resp 19   Ht 5' 6\" (1.676 m)   Wt 78 kg (172 lb)   SpO2 99%   Breastfeeding No   BMI 27.76 kg/m²       ROS: 12 point ROS obtained in details. Pertinent positives as mentioned in HPI,   otherwise negative    Physical Exam:    Vitals signs and nursing note reviewed. Constitutional:       General: He is not in acute distress. Appearance: He is well-developed. HENT:      Head: Normocephalic. Eyes:      Conjunctiva/sclera: Conjunctivae normal.      Neck:      Musculoskeletal: Normal range of motion and neck supple. Cardiovascular:      Rate and Rhythm: Normal rate and regular rhythm on monitor  Chest:      Bilateral chest movements equal.  Auscultation deferred due to Covid positive  Abdominal:      General: There is no distension. Palpations: Abdomen is soft. Tenderness: There is no abdominal tenderness. There is no rebound. Musculoskeletal: Normal range of motion. General: No tenderness. Skin:     General: Skin is warm and dry. Findings: No rash. Neurological:      Mental Status: He is alert and oriented to person, place, and time. Cranial Nerves: No cranial nerve deficit. Motor: No abnormal muscle tone. Coordination: Coordination normal.   Psychiatric:         Behavior: Behavior normal.         Thought Content:  Thought content normal.         Judgment: Judgment normal.       Labs: Results:   Chemistry Recent Labs     03/29/21  1748   GLU 88      K 2.8*      CO2 28   BUN 10   CREA 0.82   CA 8.5   AGAP 8   BUCR 12   *   TP 8.0   ALB 3.4   GLOB 4.6*   AGRAT 0.7*      CBC w/Diff Recent Labs     03/29/21  1748   WBC 7.5   RBC 4.35   HGB 12.3   HCT 37.9      GRANS 58   LYMPH 32   EOS 0      Microbiology Recent Labs     03/29/21  1759 03/29/21  1748   CULT NO GROWTH AFTER 12 HOURS NO GROWTH AFTER 12 HOURS          RADIOLOGY:    All available imaging studies/reports in connect care for this admission were reviewed      Disclaimer: Sections of this note are dictated utilizing voice recognition software, which may have resulted in some phonetic based errors in grammar and contents. Even though attempts were made to correct all the mistakes, some may have been missed, and remained in the body of the document. If questions arise, please contact our department.     Dr. Farmer April, Infectious Disease Specialist  [phone]  March 30, 2021  2:36 PM

## 2021-03-30 NOTE — ROUTINE PROCESS
Bedside and Verbal shift change report given to Oksana Iglesias RN (oncoming nurse) by SARAH Arellano RN (offgoing nurse). Report included the following information SBAR, Kardex, MAR and Recent Results.

## 2021-03-30 NOTE — PROGRESS NOTES
Comprehensive Nutrition Assessment    Type and Reason for Visit: Initial, Positive nutrition screen    Nutrition Recommendations/Plan:   - Add food preferences in diet order  - Continue all other nutrition interventions  - Encourage/ monitor po intake of meals and supplements     Nutrition Assessment:  Pt reported poor appetite/ meal intake PTA; improving since admission. Tolerating diet per pt and RN report. Receiving Ensure Clear; likes this supplement, declined other options. Food preferences discussed. Malnutrition Assessment:  Malnutrition Status:  Mild malnutrition    Context:  Acute illness     Findings of the 6 clinical characteristics of malnutrition:   Energy Intake:  7 - 50% or less of est energy requirements for 5 or more days  Weight Loss:  No significant weight loss     Body Fat Loss:  Unable to assess,     Muscle Mass Loss:  Unable to assess,    Fluid Accumulation:  Unable to assess,     Strength:  Not performed        Nutrition History and Allergies: Past medical hx:  HTN, dyslipidemia. Poor po intake x 1-2 week PTA, eating 1 meal per day and no po intake x 2 days PTA. Wt loss PTA per pt report, but unsure of exact amount. Per chart hx, noted wt of 170 lb in January 2015 and 172 lb upon current admission. No known food allergies. Estimated Daily Nutrient Needs:  Energy (kcal): 8443-8671; Weight Used for Energy Requirements: Current(78 kg)  Protein (g): 62-78; Weight Used for Protein Requirements: Current(x0.8-1)  Fluid (ml/day): 5470-0425; Method Used for Fluid Requirements: 1 ml/kcal      Nutrition Related Findings:  BM 3/30 loose,  3/28. No edema. Meds: vitamin D3, KCl      Wounds:    None       Current Nutrition Therapies:  DIET REGULAR  DIET NUTRITIONAL SUPPLEMENTS AM Snack, PM Snack;  Ensure Clear    Anthropometric Measures:  · Height:  5' 6\" (167.6 cm)  · Current Body Wt:  78 kg (171 lb 15.3 oz)   · Admission Body Wt:  171 lb 15.3 oz    · Usual Body Wt:  77.1 kg (170 lb)(January 2015 per chart hx)     · Ideal Body Wt:  130 lbs:  132.3 %   · Adjusted Body Weight:   ; Weight Adjustment for:     · BMI Category: Overweight (BMI 25.0-29. 9)       Nutrition Diagnosis:   · Mild malnutrition, In context of acute illness or injury related to early satiety(poor appetite) as evidenced by poor intake prior to admission(consuming 1 meal daily x 1-2 week PTA)      Nutrition Interventions:   Food and/or Nutrient Delivery: Continue current diet, Mineral supplement, Vitamin supplement, Continue oral nutrition supplement  Nutrition Education and Counseling: Education not indicated  Coordination of Nutrition Care: Continue to monitor while inpatient    Goals:  PO intake will meet >75% of patient's estimated nutrition needs within the next 7 days       Nutrition Monitoring and Evaluation:   Behavioral-Environmental Outcomes: None identified  Food/Nutrient Intake Outcomes: Diet advancement/tolerance, Food and nutrient intake, Supplement intake, Vitamin/mineral intake  Physical Signs/Symptoms Outcomes: Biochemical data, Nutrition focused physical findings, Meal time behavior    Discharge Planning:     Too soon to determine     Electronically signed by Carroll Jain RD on 3/30/2021 at 2:00 PM    Contact: [phone]

## 2021-03-30 NOTE — ROUTINE PROCESS
TRANSFER - IN REPORT: 
 
Verbal report received from [facility] RN(name) on Leticia Lindsay  being received from ED(unit) for routine progression of care Report consisted of patients Situation, Background, Assessment and  
Recommendations(SBAR). Information from the following report(s) Recent Results was reviewed with the receiving nurse. Opportunity for questions and clarification was provided. Assessment completed upon patients arrival to unit and care assumed. Primary Nurse Gasper Perez RN and ANASTACIO ASHLEY performed a dual skin assessment on this patient No impairment noted Stewart score is 23 PATIENT REFUSED LABS STATING SHE \"WANTS A LINE SO I DON'T HAVE TO STUCK \" Bedside and Verbal shift change report given to [address] [place] [id number] (oncoming nurse) by Loretta Saldivar RN (offgoing nurse). Report given with SBAR, Kardex, Intake/Output, MAR, Accordion and Recent Results.

## 2021-03-31 LAB
ALBUMIN SERPL-MCNC: 3.1 G/DL (ref 3.4–5)
ALBUMIN/GLOB SERPL: 0.8 {RATIO} (ref 0.8–1.7)
ALP SERPL-CCNC: 186 U/L (ref 45–117)
ALT SERPL-CCNC: 60 U/L (ref 13–56)
ANION GAP SERPL CALC-SCNC: 5 MMOL/L (ref 3–18)
AST SERPL-CCNC: 49 U/L (ref 10–38)
BASOPHILS # BLD: 0 K/UL (ref 0–0.1)
BASOPHILS NFR BLD: 0 % (ref 0–2)
BILIRUB SERPL-MCNC: 0.2 MG/DL (ref 0.2–1)
BUN SERPL-MCNC: 15 MG/DL (ref 7–18)
BUN/CREAT SERPL: 22 (ref 12–20)
CALCIUM SERPL-MCNC: 8.5 MG/DL (ref 8.5–10.1)
CHLORIDE SERPL-SCNC: 107 MMOL/L (ref 100–111)
CO2 SERPL-SCNC: 27 MMOL/L (ref 21–32)
CREAT SERPL-MCNC: 0.69 MG/DL (ref 0.6–1.3)
CRP SERPL-MCNC: 2.2 MG/DL (ref 0–0.3)
D DIMER PPP FEU-MCNC: 0.44 UG/ML(FEU)
DIFFERENTIAL METHOD BLD: ABNORMAL
EOSINOPHIL # BLD: 0 K/UL (ref 0–0.4)
EOSINOPHIL NFR BLD: 0 % (ref 0–5)
ERYTHROCYTE [DISTWIDTH] IN BLOOD BY AUTOMATED COUNT: 13.6 % (ref 11.6–14.5)
GLOBULIN SER CALC-MCNC: 3.7 G/DL (ref 2–4)
GLUCOSE SERPL-MCNC: 171 MG/DL (ref 74–99)
HCT VFR BLD AUTO: 34.1 % (ref 35–45)
HGB BLD-MCNC: 11.1 G/DL (ref 12–16)
LYMPHOCYTES # BLD: 1.1 K/UL (ref 0.9–3.6)
LYMPHOCYTES NFR BLD: 10 % (ref 21–52)
MCH RBC QN AUTO: 28.2 PG (ref 24–34)
MCHC RBC AUTO-ENTMCNC: 32.6 G/DL (ref 31–37)
MCV RBC AUTO: 86.5 FL (ref 74–97)
MONOCYTES # BLD: 0.6 K/UL (ref 0.05–1.2)
MONOCYTES NFR BLD: 6 % (ref 3–10)
NEUTS SEG # BLD: 8.6 K/UL (ref 1.8–8)
NEUTS SEG NFR BLD: 84 % (ref 40–73)
PLATELET # BLD AUTO: 311 K/UL (ref 135–420)
PMV BLD AUTO: 10.5 FL (ref 9.2–11.8)
POTASSIUM SERPL-SCNC: 4.6 MMOL/L (ref 3.5–5.5)
PROCALCITONIN SERPL-MCNC: <0.05 NG/ML
PROT SERPL-MCNC: 6.8 G/DL (ref 6.4–8.2)
RBC # BLD AUTO: 3.94 M/UL (ref 4.2–5.3)
SODIUM SERPL-SCNC: 139 MMOL/L (ref 136–145)
WBC # BLD AUTO: 10.3 K/UL (ref 4.6–13.2)

## 2021-03-31 PROCEDURE — 74011000250 HC RX REV CODE- 250: Performed by: INTERNAL MEDICINE

## 2021-03-31 PROCEDURE — 80053 COMPREHEN METABOLIC PANEL: CPT

## 2021-03-31 PROCEDURE — 65660000000 HC RM CCU STEPDOWN

## 2021-03-31 PROCEDURE — 86140 C-REACTIVE PROTEIN: CPT

## 2021-03-31 PROCEDURE — 85379 FIBRIN DEGRADATION QUANT: CPT

## 2021-03-31 PROCEDURE — 99232 SBSQ HOSP IP/OBS MODERATE 35: CPT | Performed by: INTERNAL MEDICINE

## 2021-03-31 PROCEDURE — 84145 PROCALCITONIN (PCT): CPT

## 2021-03-31 PROCEDURE — 85025 COMPLETE CBC W/AUTO DIFF WBC: CPT

## 2021-03-31 PROCEDURE — 74011000258 HC RX REV CODE- 258: Performed by: INTERNAL MEDICINE

## 2021-03-31 PROCEDURE — 74011250636 HC RX REV CODE- 250/636: Performed by: INTERNAL MEDICINE

## 2021-03-31 PROCEDURE — 36415 COLL VENOUS BLD VENIPUNCTURE: CPT

## 2021-03-31 PROCEDURE — 77010033678 HC OXYGEN DAILY

## 2021-03-31 PROCEDURE — 74011250637 HC RX REV CODE- 250/637: Performed by: INTERNAL MEDICINE

## 2021-03-31 RX ADMIN — OXYCODONE HYDROCHLORIDE AND ACETAMINOPHEN 1 TABLET: 5; 325 TABLET ORAL at 09:28

## 2021-03-31 RX ADMIN — GUAIFENESIN AND DEXTROMETHORPHAN 5 ML: 100; 10 SYRUP ORAL at 20:40

## 2021-03-31 RX ADMIN — Medication 5 MG: at 21:04

## 2021-03-31 RX ADMIN — REMDESIVIR 100 MG: 100 INJECTION, POWDER, LYOPHILIZED, FOR SOLUTION INTRAVENOUS at 02:54

## 2021-03-31 RX ADMIN — OXYCODONE HYDROCHLORIDE AND ACETAMINOPHEN 1 TABLET: 5; 325 TABLET ORAL at 02:54

## 2021-03-31 RX ADMIN — POTASSIUM CHLORIDE 40 MEQ: 1500 TABLET, EXTENDED RELEASE ORAL at 17:56

## 2021-03-31 RX ADMIN — Medication 10 ML: at 21:04

## 2021-03-31 RX ADMIN — AZITHROMYCIN DIHYDRATE 500 MG: 500 INJECTION, POWDER, LYOPHILIZED, FOR SOLUTION INTRAVENOUS at 10:16

## 2021-03-31 RX ADMIN — POTASSIUM CHLORIDE 40 MEQ: 1500 TABLET, EXTENDED RELEASE ORAL at 09:28

## 2021-03-31 RX ADMIN — ENOXAPARIN SODIUM 30 MG: 30 INJECTION SUBCUTANEOUS at 20:41

## 2021-03-31 RX ADMIN — OXYCODONE HYDROCHLORIDE AND ACETAMINOPHEN 1 TABLET: 5; 325 TABLET ORAL at 20:41

## 2021-03-31 RX ADMIN — DEXAMETHASONE SODIUM PHOSPHATE 6 MG: 4 INJECTION, SOLUTION INTRAMUSCULAR; INTRAVENOUS at 20:41

## 2021-03-31 RX ADMIN — Medication 2000 UNITS: at 09:28

## 2021-03-31 RX ADMIN — ATORVASTATIN CALCIUM 20 MG: 20 TABLET, FILM COATED ORAL at 21:03

## 2021-03-31 NOTE — PROGRESS NOTES
Infectious Disease progress Note        Reason: Severe COVID-19 pneumonia    Current abx Prior abx   Azithromycin since 3/29      Lines:       Assessment :  54-year-old female with a past medical history of hypertension and dyslipidemia as well as chronic low back pain who is presenting to the emergency department with a 7 day history of malaise and 3 day h/o worsening shortness of breath. Clinical presentation consistent with sepsis-present on admission due to confirmed COVID-19 pneumonia, severe    Underlying hypertension, obesity puts patient at high risk of clinical deterioration. Status post remdesivir since 3/29/2021    Currently patient on 3 L oxygen via nasal cannula. Appears comfortable. Management complicated due to patient's refusal to get labs drawn. Patient was agreeable after I discussed with her need for labs to establish treatment plan for covid and timing of discharge. Improving inflammatory markers    Recommendations:    1. Continue remdesivir, Decadron, d/c azithromycin  2. Monitor CRP, D-dimer, procalcitonin  3. Prophylactic anticoagulation per primary team  4. Titrate oxygen as tolerated  5. Ok to d/c remdesivir in am if able to wean off the oxygen      Above plan was discussed in details with patient. Please call me if any further questions or concerns. Will continue to participate in the care of this patient. HPI:    Upset about need for labs. She has some mild pain in her chest which is exacerbated by coughing. no nausea vomiting diarrhea or abdominal pain. Current Discharge Medication List      CONTINUE these medications which have NOT CHANGED    Details   cyclobenzaprine (FLEXERIL) 10 mg tablet Take 10 mg by mouth three (3) times daily as needed for Muscle Spasm(s). rosuvastatin (CRESTOR) 20 mg tablet Take 20 mg by mouth daily. amLODIPine (NORVASC) 2.5 mg tablet Take 2.5 mg by mouth daily.       ibuprofen (MOTRIN) 800 mg tablet Take 1,000 mg by mouth every eight (8) hours as needed for Pain. Current Facility-Administered Medications   Medication Dose Route Frequency    enoxaparin (LOVENOX) injection 30 mg  30 mg SubCUTAneous Q12H    acetaminophen (TYLENOL) suppository 650 mg  650 mg Rectal Q6H PRN    guaiFENesin-dextromethorphan (ROBITUSSIN DM) 100-10 mg/5 mL syrup 5 mL  5 mL Oral Q4H PRN    cholecalciferol (VITAMIN D3) (1000 Units /25 mcg) tablet 2,000 Units  2,000 Units Oral DAILY    potassium chloride (K-DUR, KLOR-CON) SR tablet 40 mEq  40 mEq Oral BID    amLODIPine (NORVASC) tablet 5 mg  5 mg Oral DAILY    atorvastatin (LIPITOR) tablet 20 mg  20 mg Oral QHS    remdesivir 100 mg in 0.9% sodium chloride 250 mL IVPB  100 mg IntraVENous Q24H    azithromycin (ZITHROMAX) 500 mg in 0.9% sodium chloride 250 mL (VIAL-MATE)  500 mg IntraVENous Q24H    oxyCODONE-acetaminophen (PERCOCET) 5-325 mg per tablet 1 Tab  1 Tab Oral Q6H PRN    melatonin (rapid dissolve) tablet 5 mg  5 mg Oral QHS    sodium chloride (NS) flush 5-10 mL  5-10 mL IntraVENous PRN    acetaminophen (TYLENOL) tablet 650 mg  650 mg Oral Q6H PRN    dexamethasone (DECADRON) 4 mg/mL injection 6 mg  6 mg IntraVENous Q24H       Allergies: Flagyl [metronidazole] and Sulfur    Temp (24hrs), Av °F (36.1 °C), Min:96.4 °F (35.8 °C), Max:97.5 °F (36.4 °C)    Visit Vitals  /73 (BP 1 Location: Right arm, BP Patient Position: At rest)   Pulse 71   Temp 97.5 °F (36.4 °C)   Resp 18   Ht 5' 6\" (1.676 m)   Wt 78 kg (172 lb)   SpO2 97%   Breastfeeding No   BMI 27.76 kg/m²       ROS: 12 point ROS obtained in details. Pertinent positives as mentioned in HPI,   otherwise negative    Physical Exam:    Vitals signs and nursing note reviewed. Constitutional:       General: He is not in acute distress. Appearance: He is well-developed. HENT:      Head: Normocephalic. Eyes:      Conjunctiva/sclera: Conjunctivae normal.      Neck:      Musculoskeletal: Normal range of motion and neck supple. Cardiovascular:      Rate and Rhythm: Normal rate and regular rhythm on monitor  Chest:      Bilateral chest movements equal.  Auscultation deferred due to Covid positive  Abdominal:      General: There is no distension. Palpations: Abdomen is soft. Tenderness: There is no abdominal tenderness. There is no rebound. Musculoskeletal: Normal range of motion. General: No tenderness. Skin:     General: Skin is warm and dry. Findings: No rash. Neurological:      Mental Status: He is alert and oriented to person, place, and time. Cranial Nerves: No cranial nerve deficit. Motor: No abnormal muscle tone. Coordination: Coordination normal.   Psychiatric:         Behavior: Behavior normal.         Thought Content: Thought content normal.         Judgment: Judgment normal.       Labs: Results:   Chemistry Recent Labs     03/29/21  1748   GLU 88      K 2.8*      CO2 28   BUN 10   CREA 0.82   CA 8.5   AGAP 8   BUCR 12   *   TP 8.0   ALB 3.4   GLOB 4.6*   AGRAT 0.7*      CBC w/Diff Recent Labs     03/29/21  1748   WBC 7.5   RBC 4.35   HGB 12.3   HCT 37.9      GRANS 58   LYMPH 32   EOS 0      Microbiology Recent Labs     03/29/21  1759 03/29/21  1748   CULT NO GROWTH 2 DAYS NO GROWTH 2 DAYS          RADIOLOGY:    All available imaging studies/reports in [facility] for this admission were reviewed      Disclaimer: Sections of this note are dictated utilizing voice recognition software, which may have resulted in some phonetic based errors in grammar and contents. Even though attempts were made to correct all the mistakes, some may have been missed, and remained in the body of the document. If questions arise, please contact our department.     Dr. Estella Gould, Infectious Disease Specialist  [phone]  March 31, 2021  2:36 PM

## 2021-03-31 NOTE — ROUTINE PROCESS
Bedside and Verbal shift change report given to Ari Fisher RN (oncoming nurse) by SARAH Arellano RN (offgoing nurse). Report included the following information SBAR, Kardex, MAR and Recent Results.

## 2021-03-31 NOTE — PROGRESS NOTES
[facility] Hospitalist Group  Progress Note    Patient: Sarah Arceo Age: 76 y.o. : 1955 MR#: [id number] SSN: [id number]  Date/Time: 3/31/2021    Subjective:   Pt c/o having needle sticks for lab draws      Assessment/Plan:   Patient is 76year old female with history of hypertension dyslipidemia admitted on 3/29 after presenting with chief complaint of cough and shortness of breath:    -Covid pneumonia: Some elevations in her inflammatory markers which have trended down. Pro-Maicol remains less than 0.05. Currently on Decadron, remdesivir, azithromycin.  -Hypoxia: Patient able to tolerate to room air to some extent and with less conversational dyspnea on oxygen supplementation. Due to above. -Hypokalemia:resolved    HISTORY OF:  -Hypertension  -Dyslipidemia    PLAN:  -Continue Decadron, remdesivir course, oxygen supplementation, monitor closely.   -Replace and follow potassium    Additional Notes:      Case discussed with:  [x]Patient  []Family  []Nursing  []Case Management  DVT Prophylaxis:  [x]Lovenox  []Hep SQ  []SCDs  []Coumadin   []On Heparin gtt    Objective:   VS:   Visit Vitals  /87   Pulse 76   Temp 97.4 °F (36.3 °C)   Resp 18   Ht 5' 6\" (1.676 m)   Wt 78 kg (172 lb)   SpO2 98%   Breastfeeding No   BMI 27.76 kg/m²      Tmax/24hrs: Temp (24hrs), Av.1 °F (36.2 °C), Min:96.4 °F (35.8 °C), Max:97.5 °F (36.4 °C)    Input/Output: No intake or output data in the 24 hours ending 21 1538    General:  Alert, awake, in NAD  Cardiovascular:  No JVD, no peripheral edema  Pulmonary:  With conversational dyspnea  GI:  abd diffusely tender  Extremities:  No edema  Additional:      Labs:    Recent Results (from the past 24 hour(s))   CBC WITH AUTOMATED DIFF    Collection Time: 21 12:14 PM   Result Value Ref Range    WBC 10.3 4.6 - 13.2 K/uL    RBC 3.94 (L) 4.20 - 5.30 M/uL    HGB 11.1 (L) 12.0 - 16.0 g/dL    HCT 34.1 (L) 35.0 - 45.0 %    MCV 86.5 74.0 - 97.0 FL    MCH 28.2 24.0 - 34.0 PG    MCHC 32.6 31.0 - 37.0 g/dL    RDW 13.6 11.6 - 14.5 %    PLATELET 900 351 - 551 K/uL    MPV 10.5 9.2 - 11.8 FL    NEUTROPHILS 84 (H) 40 - 73 %    LYMPHOCYTES 10 (L) 21 - 52 %    MONOCYTES 6 3 - 10 %    EOSINOPHILS 0 0 - 5 %    BASOPHILS 0 0 - 2 %    ABS. NEUTROPHILS 8.6 (H) 1.8 - 8.0 K/UL    ABS. LYMPHOCYTES 1.1 0.9 - 3.6 K/UL    ABS. MONOCYTES 0.6 0.05 - 1.2 K/UL    ABS. EOSINOPHILS 0.0 0.0 - 0.4 K/UL    ABS. BASOPHILS 0.0 0.0 - 0.1 K/UL    DF AUTOMATED     PROCALCITONIN    Collection Time: 03/31/21 12:14 PM   Result Value Ref Range    Procalcitonin <0.05 ng/mL   C REACTIVE PROTEIN, QT    Collection Time: 03/31/21 12:14 PM   Result Value Ref Range    C-Reactive protein 2.2 (H) 0 - 0.3 mg/dL   D DIMER    Collection Time: 03/31/21 12:14 PM   Result Value Ref Range    D DIMER 0.44 <0.46 ug/ml(FEU)   METABOLIC PANEL, COMPREHENSIVE    Collection Time: 03/31/21 12:14 PM   Result Value Ref Range    Sodium 139 136 - 145 mmol/L    Potassium 4.6 3.5 - 5.5 mmol/L    Chloride 107 100 - 111 mmol/L    CO2 27 21 - 32 mmol/L    Anion gap 5 3.0 - 18 mmol/L    Glucose 171 (H) 74 - 99 mg/dL    BUN 15 7.0 - 18 MG/DL    Creatinine 0.69 0.6 - 1.3 MG/DL    BUN/Creatinine ratio 22 (H) 12 - 20      GFR est AA >60 >60 ml/min/1.73m2    GFR est non-AA >60 >60 ml/min/1.73m2    Calcium 8.5 8.5 - 10.1 MG/DL    Bilirubin, total 0.2 0.2 - 1.0 MG/DL    ALT (SGPT) 60 (H) 13 - 56 U/L    AST (SGOT) 49 (H) 10 - 38 U/L    Alk.  phosphatase 186 (H) 45 - 117 U/L    Protein, total 6.8 6.4 - 8.2 g/dL    Albumin 3.1 (L) 3.4 - 5.0 g/dL    Globulin 3.7 2.0 - 4.0 g/dL    A-G Ratio 0.8 0.8 - 1.7       Additional Data Reviewed:      Signed By: Ameya Davis MD     March 31, 2021 4:13 PM

## 2021-03-31 NOTE — PROGRESS NOTES
2130: Pt addressed concerns regarding lab draws not getting done despite her refusing them because she would prefer a central line so she doesn't have to be stuck so many times. Pt states she addressed these concerns with anahi PENA and RN and she is upset that she hasn't received a line yet. This RN informed pt that a line will not be able to be placed this evening. Will relay to anahi RN.    0300: Pt again asking about central line placement. This RN informed pt that central lines are not typically placed just because a pt doesn't want to be stuck when she has perfectly good veins. This seemed to upset the pt further. The pt stated that she needs a line because she's sick with COVID and needs to know if she's getting better or not and the doctor yesterday agreed to get her a line. This RN informed pt that there were no orders for line placement put in by the doctor. The pt stated \"well she's a two-faced liar then. \" Pt requesting to speak to the \"doctor's boss or head of department\" and the president of the hospital. Dr. Jena Lindquist on the unit and informed of pt's concerns. Nursing supervisor also notified. Pt provided with patient advocate phone #.    9442: Pt requesting to take photos of the notes in her chart with her personal cell phone. Nursing supervisor states we can provide the pt with a link to her [employer]. 0340: Pt refused phlebotomy to draw morning labs.

## 2021-03-31 NOTE — ROUTINE PROCESS
Bedside and Verbal shift change report given to Clinton Hernandez (oncoming nurse) by Biju Carolina (offgoing nurse). Report included the following information SBAR.

## 2021-03-31 NOTE — PROGRESS NOTES
conducted an initial consultation and Spiritual Assessment for Alivia Domingo, who is a 76 y.o.,female. Patients Primary Language is: [place]. According to the patients EMR Confucianism Affiliation is: [facility].     The reason the Patient came to the hospital is:   Patient Active Problem List    Diagnosis Date Noted    Acute pneumonia 03/29/2021    Suspected COVID-19 virus infection 03/29/2021        The  provided the following Interventions:  Initiated a relationship of care and support with patient as she was standing at doorway to her room with the door open and not wearing a mask. Patient was very angry and confrontive with staff. Patient declared that she was not going to do what they were asking because she felt they were being rude and disrespectful to her. Provided chaplaincy education and assistance to staff by trying to get her reconsider and be a little more respectful to other patients who are on the floor. Listened to her story and attempted to find some peace with her. The following outcomes were achieved:  Patient shared limited information about her medical narrative and beliefs. Patient processed feeling about current hospitalization. .    Assessment:  Patient does not have any Orthodox/cultural needs that will affect patients preferences in health care. There are no further spiritual or Orthodox issues which require Spiritual Care Services interventions at this time. Plan:  Chaplains will continue to follow and will provide pastoral care on an as needed/requested basis    . [facility]   [phone]

## 2021-04-01 VITALS
BODY MASS INDEX: 27.64 KG/M2 | OXYGEN SATURATION: 95 % | WEIGHT: 172 LBS | SYSTOLIC BLOOD PRESSURE: 118 MMHG | RESPIRATION RATE: 19 BRPM | TEMPERATURE: 98.6 F | DIASTOLIC BLOOD PRESSURE: 74 MMHG | HEART RATE: 77 BPM | HEIGHT: 66 IN

## 2021-04-01 LAB
ALBUMIN SERPL-MCNC: 2.9 G/DL (ref 3.4–5)
ALBUMIN/GLOB SERPL: 0.7 {RATIO} (ref 0.8–1.7)
ALP SERPL-CCNC: 176 U/L (ref 45–117)
ALT SERPL-CCNC: 51 U/L (ref 13–56)
ANION GAP SERPL CALC-SCNC: 5 MMOL/L (ref 3–18)
AST SERPL-CCNC: 36 U/L (ref 10–38)
BILIRUB SERPL-MCNC: 0.7 MG/DL (ref 0.2–1)
BUN SERPL-MCNC: 15 MG/DL (ref 7–18)
BUN/CREAT SERPL: 21 (ref 12–20)
CALCIUM SERPL-MCNC: 8.5 MG/DL (ref 8.5–10.1)
CHLORIDE SERPL-SCNC: 106 MMOL/L (ref 100–111)
CO2 SERPL-SCNC: 26 MMOL/L (ref 21–32)
CREAT SERPL-MCNC: 0.71 MG/DL (ref 0.6–1.3)
CRP SERPL-MCNC: 1.5 MG/DL (ref 0–0.3)
D DIMER PPP FEU-MCNC: 0.47 UG/ML(FEU)
GLOBULIN SER CALC-MCNC: 4.2 G/DL (ref 2–4)
GLUCOSE SERPL-MCNC: 146 MG/DL (ref 74–99)
POTASSIUM SERPL-SCNC: 4.2 MMOL/L (ref 3.5–5.5)
PROCALCITONIN SERPL-MCNC: <0.05 NG/ML
PROT SERPL-MCNC: 7.1 G/DL (ref 6.4–8.2)
SODIUM SERPL-SCNC: 137 MMOL/L (ref 136–145)

## 2021-04-01 PROCEDURE — 36415 COLL VENOUS BLD VENIPUNCTURE: CPT

## 2021-04-01 PROCEDURE — 74011250637 HC RX REV CODE- 250/637: Performed by: INTERNAL MEDICINE

## 2021-04-01 PROCEDURE — 86140 C-REACTIVE PROTEIN: CPT

## 2021-04-01 PROCEDURE — 74011000250 HC RX REV CODE- 250: Performed by: INTERNAL MEDICINE

## 2021-04-01 PROCEDURE — 74011000258 HC RX REV CODE- 258: Performed by: INTERNAL MEDICINE

## 2021-04-01 PROCEDURE — 99239 HOSP IP/OBS DSCHRG MGMT >30: CPT | Performed by: INTERNAL MEDICINE

## 2021-04-01 PROCEDURE — 80053 COMPREHEN METABOLIC PANEL: CPT

## 2021-04-01 PROCEDURE — 74011250636 HC RX REV CODE- 250/636: Performed by: INTERNAL MEDICINE

## 2021-04-01 PROCEDURE — 85379 FIBRIN DEGRADATION QUANT: CPT

## 2021-04-01 PROCEDURE — 84145 PROCALCITONIN (PCT): CPT

## 2021-04-01 RX ORDER — MELATONIN
2000 DAILY
Qty: 60 TAB | Refills: 0 | Status: SHIPPED | OUTPATIENT
Start: 2021-04-02 | End: 2021-05-02

## 2021-04-01 RX ORDER — POTASSIUM CHLORIDE 1500 MG/1
40 TABLET, FILM COATED, EXTENDED RELEASE ORAL DAILY
Qty: 60 TAB | Refills: 0 | Status: SHIPPED | OUTPATIENT
Start: 2021-04-01 | End: 2021-05-01

## 2021-04-01 RX ORDER — DEXAMETHASONE 6 MG/1
6 TABLET ORAL
Qty: 7 TAB | Refills: 0 | Status: SHIPPED | OUTPATIENT
Start: 2021-04-01 | End: 2021-04-08

## 2021-04-01 RX ADMIN — AZITHROMYCIN DIHYDRATE 500 MG: 500 INJECTION, POWDER, LYOPHILIZED, FOR SOLUTION INTRAVENOUS at 10:16

## 2021-04-01 RX ADMIN — POTASSIUM CHLORIDE 40 MEQ: 1500 TABLET, EXTENDED RELEASE ORAL at 08:23

## 2021-04-01 RX ADMIN — ENOXAPARIN SODIUM 30 MG: 30 INJECTION SUBCUTANEOUS at 08:22

## 2021-04-01 RX ADMIN — AMLODIPINE BESYLATE 5 MG: 5 TABLET ORAL at 08:22

## 2021-04-01 RX ADMIN — REMDESIVIR 100 MG: 100 INJECTION, POWDER, LYOPHILIZED, FOR SOLUTION INTRAVENOUS at 03:50

## 2021-04-01 RX ADMIN — OXYCODONE HYDROCHLORIDE AND ACETAMINOPHEN 1 TABLET: 5; 325 TABLET ORAL at 08:23

## 2021-04-01 RX ADMIN — Medication 2000 UNITS: at 08:23

## 2021-04-01 RX ADMIN — GUAIFENESIN AND DEXTROMETHORPHAN 5 ML: 100; 10 SYRUP ORAL at 08:22

## 2021-04-01 RX ADMIN — POTASSIUM CHLORIDE 40 MEQ: 1500 TABLET, EXTENDED RELEASE ORAL at 18:01

## 2021-04-01 NOTE — PROGRESS NOTES
Problem: Pain  Goal: *Control of Pain  Outcome: Progressing Towards Goal  Goal: *PALLIATIVE CARE:  Alleviation of Pain  Outcome: Progressing Towards Goal     Problem: Patient Education: Go to Patient Education Activity  Goal: Patient/Family Education  Outcome: Progressing Towards Goal     Problem: Falls - Risk of  Goal: *Absence of Falls  Description: Document Hung Pereyra Fall Risk and appropriate interventions in the flowsheet.   Outcome: Progressing Towards Goal  Note: Fall Risk Interventions:  Mobility Interventions: Assess mobility with egress test, Patient to call before getting OOB         Medication Interventions: Assess postural VS orthostatic hypotension, Patient to call before getting OOB, Teach patient to arise slowly         History of Falls Interventions: Bed/chair exit alarm, Investigate reason for fall, Room close to nurse's station         Problem: Patient Education: Go to Patient Education Activity  Goal: Patient/Family Education  Outcome: Progressing Towards Goal

## 2021-04-01 NOTE — DISCHARGE INSTRUCTIONS
DISCHARGE SUMMARY from Nurse  Patient armband removed and shredded  MyChart Activation    Thank you for requesting access to [employer]. Please follow the instructions below to securely access and download your online medical record. [employer] allows you to send messages to your doctor, view your test results, renew your prescriptions, schedule appointments, and more. How Do I Sign Up? 1. In your internet browser, go to [email or web address]  2. Click on the First Time User? Click Here link in the Sign In box. You will be redirect to the New Member Sign Up page. 3. Enter your [employer] Access Code exactly as it appears below. You will not need to use this code after youve completed the sign-up process. If you do not sign up before the expiration date, you must request a new code. [employer] Access Code: [email or web address]  Expires: 2021  4:42 PM (This is the date your [employer] access code will )    4. Enter the last four digits of your Social Security Number (xxxx) and Date of Birth (mm/dd/yyyy) as indicated and click Submit. You will be taken to the next sign-up page. 5. Create a [employer] ID. This will be your [employer] login ID and cannot be changed, so think of one that is secure and easy to remember. 6. Create a [employer] password. You can change your password at any time. 7. Enter your Password Reset Question and Answer. This can be used at a later time if you forget your password. 8. Enter your e-mail address. You will receive e-mail notification when new information is available in [address]. 9. Click Sign Up. You can now view and download portions of your medical record. 10. Click the Download Summary menu link to download a portable copy of your medical information. Additional Information    If you have questions, please visit the Frequently Asked Questions section of the [employer] website at [email or web address] Remember, [employer] is NOT to be used for urgent needs.  For medical emergencies, dial 911. PATIENT INSTRUCTIONS:    After general anesthesia or intravenous sedation, for 24 hours or while taking prescription Narcotics:  · Limit your activities  · Do not drive and operate hazardous machinery  · Do not make important personal or business decisions  · Do  not drink alcoholic beverages  · If you have not urinated within 8 hours after discharge, please contact your surgeon on call. Report the following to your surgeon:  · Excessive pain, swelling, redness or odor of or around the surgical area  · Temperature over 100.5  · Nausea and vomiting lasting longer than 4 hours or if unable to take medications  · Any signs of decreased circulation or nerve impairment to extremity: change in color, persistent  numbness, tingling, coldness or increase pain  · Any questions    What to do at Home:  Recommended activity: Activity as tolerated    If you experience any of the following symptoms shortness of breath, weakness on one side of the body, trouble swallowing or chest pain, please follow up with EMS ([phone]). *  Please give a list of your current medications to your Primary Care Provider. *  Please update this list whenever your medications are discontinued, doses are      changed, or new medications (including over-the-counter products) are added. *  Please carry medication information at all times in case of emergency situations. These are general instructions for a healthy lifestyle:    No smoking/ No tobacco products/ Avoid exposure to second hand smoke  Surgeon General's Warning:  Quitting smoking now greatly reduces serious risk to your health.     Obesity, smoking, and sedentary lifestyle greatly increases your risk for illness    A healthy diet, regular physical exercise & weight monitoring are important for maintaining a healthy lifestyle    You may be retaining fluid if you have a history of heart failure or if you experience any of the following symptoms: Weight gain of 3 pounds or more overnight or 5 pounds in a week, increased swelling in our hands or feet or shortness of breath while lying flat in bed. Please call your doctor as soon as you notice any of these symptoms; do not wait until your next office visit. The discharge information has been reviewed with the patient. The patient verbalized understanding. Discharge medications reviewed with the patient and appropriate educational materials and side effects teaching were provided.   ___________________________________________________________________________________________________________________________________

## 2021-04-01 NOTE — PROGRESS NOTES
Infectious Disease progress Note        Reason: Severe COVID-19 pneumonia    Current abx Prior abx   Azithromycin since 3/29-3/31      Lines:       Assessment :  30-year-old female with a past medical history of hypertension and dyslipidemia as well as chronic low back pain who is presenting to the emergency department with a 7 day history of malaise and 3 day h/o worsening shortness of breath. Clinical presentation consistent with sepsis-present on admission due to confirmed COVID-19 pneumonia, severe    Underlying hypertension, obesity puts patient at high risk of clinical deterioration. Status post remdesivir since 3/29/2021    Currently patient on room air. Appears comfortable. Oxygen saturation 92% on room air on ambulation. Improving inflammatory markers    Recommendations:    1. Discontinue remdesivir, continue Decadron, d/c azithromycin  2. Discharge planning per primary team  3. Patient's questions about timing of quarantine, travel, staying in hotel etc. were answered to her satisfaction. Above plan was discussed in details with patient,dr. poole. Please call me if any further questions or concerns. Will continue to participate in the care of this patient. HPI:    Feels good. Improved cough, shortness of breath. No nausea vomiting diarrhea or abdominal pain. Current Discharge Medication List      CONTINUE these medications which have NOT CHANGED    Details   cyclobenzaprine (FLEXERIL) 10 mg tablet Take 10 mg by mouth three (3) times daily as needed for Muscle Spasm(s). rosuvastatin (CRESTOR) 20 mg tablet Take 20 mg by mouth daily. amLODIPine (NORVASC) 2.5 mg tablet Take 2.5 mg by mouth daily. ibuprofen (MOTRIN) 800 mg tablet Take 1,000 mg by mouth every eight (8) hours as needed for Pain.              Current Facility-Administered Medications   Medication Dose Route Frequency    enoxaparin (LOVENOX) injection 30 mg  30 mg SubCUTAneous Q12H    acetaminophen (TYLENOL) suppository 650 mg  650 mg Rectal Q6H PRN    guaiFENesin-dextromethorphan (ROBITUSSIN DM) 100-10 mg/5 mL syrup 5 mL  5 mL Oral Q4H PRN    cholecalciferol (VITAMIN D3) (1000 Units /25 mcg) tablet 2,000 Units  2,000 Units Oral DAILY    potassium chloride (K-DUR, KLOR-CON) SR tablet 40 mEq  40 mEq Oral BID    amLODIPine (NORVASC) tablet 5 mg  5 mg Oral DAILY    atorvastatin (LIPITOR) tablet 20 mg  20 mg Oral QHS    remdesivir 100 mg in 0.9% sodium chloride 250 mL IVPB  100 mg IntraVENous Q24H    azithromycin (ZITHROMAX) 500 mg in 0.9% sodium chloride 250 mL (VIAL-MATE)  500 mg IntraVENous Q24H    oxyCODONE-acetaminophen (PERCOCET) 5-325 mg per tablet 1 Tab  1 Tab Oral Q6H PRN    melatonin (rapid dissolve) tablet 5 mg  5 mg Oral QHS    sodium chloride (NS) flush 5-10 mL  5-10 mL IntraVENous PRN    acetaminophen (TYLENOL) tablet 650 mg  650 mg Oral Q6H PRN    dexamethasone (DECADRON) 4 mg/mL injection 6 mg  6 mg IntraVENous Q24H       Allergies: Flagyl [metronidazole] and Sulfur    Temp (24hrs), Av.6 °F (36.4 °C), Min:97.3 °F (36.3 °C), Max:98.2 °F (36.8 °C)    Visit Vitals  BP (!) 134/90   Pulse 74   Temp 98.2 °F (36.8 °C)   Resp 19   Ht 5' 6\" (1.676 m)   Wt 78 kg (172 lb)   SpO2 94%   Breastfeeding No   BMI 27.76 kg/m²       ROS: 12 point ROS obtained in details. Pertinent positives as mentioned in HPI,   otherwise negative    Physical Exam:    Vitals signs and nursing note reviewed. Constitutional:       General: He is not in acute distress. Appearance: He is well-developed. HENT:      Head: Normocephalic. Eyes:      Conjunctiva/sclera: Conjunctivae normal.      Neck:      Musculoskeletal: Normal range of motion and neck supple. Cardiovascular:      Rate and Rhythm: Normal rate and regular rhythm on monitor  Chest:      Bilateral chest movements equal.  Auscultation deferred due to Covid positive  Abdominal:      General: There is no distension. Palpations: Abdomen is soft. Tenderness: There is no abdominal tenderness. There is no rebound. Musculoskeletal: Normal range of motion. General: No tenderness. Skin:     General: Skin is warm and dry. Findings: No rash. Neurological:      Mental Status: He is alert and oriented to person, place, and time. Cranial Nerves: No cranial nerve deficit. Motor: No abnormal muscle tone. Coordination: Coordination normal.   Psychiatric:         Behavior: Behavior normal.         Thought Content: Thought content normal.         Judgment: Judgment normal.       Labs: Results:   Chemistry Recent Labs     04/01/21  0456 03/31/21  1214 03/29/21  1748   * 171* 88    139 138   K 4.2 4.6 2.8*    107 102   CO2 26 27 28   BUN 15 15 10   CREA 0.71 0.69 0.82   CA 8.5 8.5 8.5   AGAP 5 5 8   BUCR 21* 22* 12   * 186* 200*   TP 7.1 6.8 8.0   ALB 2.9* 3.1* 3.4   GLOB 4.2* 3.7 4.6*   AGRAT 0.7* 0.8 0.7*      CBC w/Diff Recent Labs     03/31/21  1214 03/29/21  1748   WBC 10.3 7.5   RBC 3.94* 4.35   HGB 11.1* 12.3   HCT 34.1* 37.9    281   GRANS 84* 58   LYMPH 10* 32   EOS 0 0      Microbiology Recent Labs     03/29/21  1759 03/29/21  1748   CULT NO GROWTH 3 DAYS NO GROWTH 3 DAYS          RADIOLOGY:    All available imaging studies/reports in [facility] for this admission were reviewed      Disclaimer: Sections of this note are dictated utilizing voice recognition software, which may have resulted in some phonetic based errors in grammar and contents. Even though attempts were made to correct all the mistakes, some may have been missed, and remained in the body of the document. If questions arise, please contact our department.     Dr. Juan Zapata, Infectious Disease Specialist  [phone]  April 1, 2021  2:36 PM

## 2021-04-01 NOTE — PROGRESS NOTES
Reason for Admission:  Suspected COVID-19 virus infection [Z20.822]  Acute pneumonia [J18.9]                 RUR Score:    7            Plan for utilizing home health:    np                      Likelihood of Readmission:   LOW                         Transition of Care Plan:              Initial assessment completed with patient. Cognitive status of patient: oriented to time, place, person and situation. Face sheet information confirmed:  yes. The patient designates brother to participate in her discharge plan and to receive any needed information. This patient lives in a single family home with patient. Patient is able to navigate steps as needed. Prior to hospitalization, patient was considered to be independent with ADLs/IADLS : yes . Patient has a current ACP document on file: no      Healthcare Decision Maker:     Click here to complete [address] including selection of the Healthcare Decision Maker Relationship (ie \"Primary\")    The patient and brother will be available to transport patient home upon discharge. The patient already has none reported,  medical equipment available in the home. Patient is not currently active with home health. .  Patient has not stayed in a skilled nursing facility or rehab. This patient is on dialysis :no    Freedom of choice signed: no. Currently, the discharge plan is Home. The patient states that she can obtain her medications from the pharmacy, and take her medications as directed. Patient's current insurance is Medicare and blue cross       Care Management Interventions  PCP Verified by CM: Yes  Mode of Transport at Discharge: Self  Current Support Network:  Other(Visiting from [facility])  Confirm Follow Up Transport: Family  The Plan for Transition of Care is Related to the Following Treatment Goals : home  Discharge Location  Discharge Placement: Home        Steve Davila RN BSN  Care Manager  [phone]

## 2021-04-01 NOTE — PROGRESS NOTES
Patients brother is picking up patient and taking her to a hotel. [facility] Medications filled at outpatient pharmacy to  to take home. D/C orders received. Chart reviewed. Pt will be transported home by brother.  available as needed.     Enoch Holloway, RN BSN  Care Manager  [phone]

## 2021-04-01 NOTE — PROGRESS NOTES
Problem: Pain  Goal: *Control of Pain  Outcome: Progressing Towards Goal     Problem: Pain  Goal: *PALLIATIVE CARE:  Alleviation of Pain  Outcome: Progressing Towards Goal     Problem: Falls - Risk of  Goal: *Absence of Falls  Description: Document Aisha Fall Risk and appropriate interventions in the flowsheet.   Outcome: Progressing Towards Goal  Note: Fall Risk Interventions:  Mobility Interventions: Communicate number of staff needed for ambulation/transfer, Bed/chair exit alarm, Patient to call before getting OOB, Strengthening exercises (ROM-active/passive)         Medication Interventions: Assess postural VS orthostatic hypotension, Bed/chair exit alarm, Patient to call before getting OOB, Teach patient to arise slowly         History of Falls Interventions: Bed/chair exit alarm, Door open when patient unattended         Problem: Injury - Risk of, Adverse Drug Event  Goal: *Absence of adverse drug events  Outcome: Progressing Towards Goal     Problem: Airway Clearance - Ineffective  Goal: Achieve or maintain patent airway  Outcome: Progressing Towards Goal     Problem: Gas Exchange - Impaired  Goal: Absence of hypoxia  Outcome: Progressing Towards Goal     Problem: Gas Exchange - Impaired  Goal: Promote optimal lung function  Outcome: Progressing Towards Goal     Problem: Breathing Pattern - Ineffective  Goal: Ability to achieve and maintain a regular respiratory rate  Outcome: Progressing Towards Goal     Problem: Nutrition Deficits  Goal: Optimize nutrtional status  Outcome: Progressing Towards Goal     Problem: Risk for Fluid Volume Deficit  Goal: Maintain normal heart rhythm  Outcome: Progressing Towards Goal     Problem: Loneliness or Risk for Loneliness  Goal: Demonstrate positive use of time alone when socialization is not possible  Outcome: Progressing Towards Goal     Problem: Fatigue  Goal: Verbalize increase energy and improved vitality  Outcome: Progressing Towards Goal

## 2021-04-01 NOTE — DISCHARGE SUMMARY
[facility]  [facility] Summary       Patient: Khoi Murrell Age: 76 y.o. : 1955 MR#: [id number] SSN: [id number]  PCP on record: Karla Godwin, Not On File  Admit date: 3/29/2021  Discharge date: 2021    Consults: -ELIO Hanna,-ID   -   Procedures: None  -     Significant Diagnostic Studies: -CXR 3/29/21:  IMPRESSION     Bilateral streaky consolidation worrisome for multifocal pneumonia in the  setting of confirmed Covid 19. Possible left pleural effusion.  -    Discharge Diagnoses:  Sepsis POA  COVID 19 pneumonia                                           Patient Active Problem List   Diagnosis Code    Acute pneumonia J18.9    Suspected COVID-19 virus infection Z20.822       Hospital Course by Problem   1. Patient is 76year old female with history of hypertension dyslipidemia admitted on 3/29 after presenting with chief complaint of cough and shortness of breath:     -Covid pneumonia: Some elevations in her inflammatory markers which trended down, treated with remdesivir Decadron as well as a azithromycin. She was evaluated by the infectious disease specialist.  She had significant improvement with this regimen and because of this her remdesivir was discontinued prior to her finishing her course. She was able to ambulate on room air without significant desaturations and remained above 92%. She has been discharged in stable condition. .  Pro-Maicol pain less than 0.05.      -Hypokalemia:resolved, likely due to hydrochlorothiazide and renal wasting. She has been repleted and is discharged on a daily course of potassium supplementation. She will need to have regular checks when on hydrochlorothiazide of her electrolytes given her hypokalemia on hydrochlorothiazide.     HISTORY OF:  -Hypertension  -Dyslipidemia     Patient has been discharged in stable condition with a course of Decadron.         Today's examination of the patient revealed:     Subjective:   Patient has no complaints. She is okay with going home. Objective:   VS:   Visit Vitals  /74 (BP 1 Location: Right upper arm, BP Patient Position: At rest)   Pulse 77   Temp 98.6 °F (37 °C)   Resp 19   Ht 5' 6\" (1.676 m)   Wt 78 kg (172 lb)   SpO2 95%   Breastfeeding No   BMI 27.76 kg/m²      Tmax/24hrs: Temp (24hrs), Av.7 °F (36.5 °C), Min:97.2 °F (36.2 °C), Max:98.6 °F (37 °C)     Input/Output: No intake or output data in the 24 hours ending 21 1614    General:  Alert, awake, in NAD  Cardiovascular:  No JVD, no peripheral edema  Pulmonary:  With conversational dyspnea  GI:  abd diffusely tender  Extremities:  No edema  Additional:         Labs:    Recent Results (from the past 24 hour(s))   METABOLIC PANEL, COMPREHENSIVE    Collection Time: 21  4:56 AM   Result Value Ref Range    Sodium 137 136 - 145 mmol/L    Potassium 4.2 3.5 - 5.5 mmol/L    Chloride 106 100 - 111 mmol/L    CO2 26 21 - 32 mmol/L    Anion gap 5 3.0 - 18 mmol/L    Glucose 146 (H) 74 - 99 mg/dL    BUN 15 7.0 - 18 MG/DL    Creatinine 0.71 0.6 - 1.3 MG/DL    BUN/Creatinine ratio 21 (H) 12 - 20      GFR est AA >60 >60 ml/min/1.73m2    GFR est non-AA >60 >60 ml/min/1.73m2    Calcium 8.5 8.5 - 10.1 MG/DL    Bilirubin, total 0.7 0.2 - 1.0 MG/DL    ALT (SGPT) 51 13 - 56 U/L    AST (SGOT) 36 10 - 38 U/L    Alk.  phosphatase 176 (H) 45 - 117 U/L    Protein, total 7.1 6.4 - 8.2 g/dL    Albumin 2.9 (L) 3.4 - 5.0 g/dL    Globulin 4.2 (H) 2.0 - 4.0 g/dL    A-G Ratio 0.7 (L) 0.8 - 1.7     C REACTIVE PROTEIN, QT    Collection Time: 21  4:56 AM   Result Value Ref Range    C-Reactive protein 1.5 (H) 0 - 0.3 mg/dL   PROCALCITONIN    Collection Time: 21  4:56 AM   Result Value Ref Range    Procalcitonin <0.05 ng/mL   D DIMER    Collection Time: 21  4:56 AM   Result Value Ref Range    D DIMER 0.47 (H) <0.46 ug/ml(FEU)     Additional Data Reviewed:     Condition on discharge: stable  Disposition:    [x]Home   []Home with Home Health []SNF/NH   []Rehab   []Home with family   []Alternate Facility:____________________      Discharge Medications:     Current Discharge Medication List      START taking these medications    Details   cholecalciferol (VITAMIN D3) (1000 Units /25 mcg) tablet Take 2 Tabs by mouth daily for 30 days. Qty: 60 Tab, Refills: 0      dexAMETHasone (Decadron) 6 mg tablet Take 1 Tab by mouth Daily (before breakfast) for 7 days. Qty: 7 Tab, Refills: 0      potassium chloride SR (K-TAB) 20 mEq tablet Take 2 Tabs by mouth daily for 30 days. Qty: 60 Tab, Refills: 0         CONTINUE these medications which have NOT CHANGED    Details   cyclobenzaprine (FLEXERIL) 10 mg tablet Take 10 mg by mouth three (3) times daily as needed for Muscle Spasm(s). rosuvastatin (CRESTOR) 20 mg tablet Take 20 mg by mouth daily. amLODIPine (NORVASC) 2.5 mg tablet Take 2.5 mg by mouth daily. STOP taking these medications       ibuprofen (MOTRIN) 800 mg tablet Comments:   Reason for Stopping: Follow-up Appointments:   1.  Your PCP: [facility], Not On File, within 7-10days      >30 minutes spent coordinating this discharge (review instructions/follow-up, prescriptions, preparing report for sign off)    Signed:  Sandy Butt MD  4/1/2021  4:14 PM

## 2021-04-02 ENCOUNTER — PATIENT OUTREACH (OUTPATIENT)
Dept: CASE MANAGEMENT | Age: 66
End: 2021-04-02

## 2021-04-03 ENCOUNTER — PATIENT OUTREACH (OUTPATIENT)
Dept: CASE MANAGEMENT | Age: 66
End: 2021-04-03

## 2021-04-04 LAB
BACTERIA SPEC CULT: NORMAL
BACTERIA SPEC CULT: NORMAL
SERVICE CMNT-IMP: NORMAL
SERVICE CMNT-IMP: NORMAL

## 2021-04-07 ENCOUNTER — PATIENT OUTREACH (OUTPATIENT)
Dept: CASE MANAGEMENT | Age: 66
End: 2021-04-07

## 2021-04-16 ENCOUNTER — PATIENT OUTREACH (OUTPATIENT)
Dept: CASE MANAGEMENT | Age: 66
End: 2021-04-16

## 2021-04-16 NOTE — PROGRESS NOTES
Patient resolved from [address] Transitions episode on 4/16/2021. Discussed COVID-19 related testing which was available at this time. Test results were positive. Patient informed of results, if available? yes     Patient/family has been provided the following resources and education related to COVID-19:                         Signs, symptoms and red flags related to COVID-19            [facility] exposure and quarantine guidelines            [facility] exposure contact - [phone]            Contact for their local Department of Health                 Patient currently reports that the following symptoms have improved:  no new symptoms and no worsening symptoms. No further outreach scheduled with this CTN/ACM/LPN/HC/ MA. Episode of Care resolved. Patient has this CTN/ACM/LPN/HC/MA contact information if future needs arise.

## 2024-06-24 NOTE — PROGRESS NOTES
4/2/2021 9:23 AM     CTN attempted to contact patient for hospital follow up. Patient admitted to [facility] on 3/29/2021 and discharged on 4/1/2021 for acute pneumonia and COVID +. Message left introducing myself, the purpose of the call and giving my contact information. Requested that patient call back at her earliest convenience. Will follow.
Prep Survey      Flowsheet Row Responses   [facility] facility patient discharged from? [facility]   Is LACE score < 7 ? No   Eligibility Readm Mgmt   Discharge diagnosis Cellulitis of left leg   Does the patient have one of the following disease processes/diagnoses(primary or secondary)? Other   Does the patient have Home health ordered? Yes   What is the Home health agency?  [facility]   Is there a DME ordered? No   Prep survey completed? Yes            Jade Quinones Registered Nurse          
[FreeTextEntry1] : Lenore presents for urinary frequency. She is also having vaginal burning when she wipes. She recently finished a course of doxycycline for cellulitis. \par She has been trying to drink to help flush it out. \par Does not feel like a yeast infection, which she had recently and was treated with diflucan. The yeast vaginitis symptoms have resolved since treatment.